# Patient Record
Sex: FEMALE | Race: WHITE | Employment: FULL TIME | URBAN - METROPOLITAN AREA
[De-identification: names, ages, dates, MRNs, and addresses within clinical notes are randomized per-mention and may not be internally consistent; named-entity substitution may affect disease eponyms.]

---

## 2018-03-27 ENCOUNTER — EMPLOYEE WELLNESS (OUTPATIENT)
Dept: OTHER | Age: 28
End: 2018-03-27

## 2018-03-27 LAB
CHOLESTEROL, TOTAL: 193 MG/DL (ref 0–199)
GLUCOSE BLD-MCNC: 93 MG/DL (ref 74–107)
HDLC SERPL-MCNC: 81 MG/DL
LDL CHOLESTEROL CALCULATED: 97 MG/DL (ref 0–99)
TRIGL SERPL-MCNC: 74 MG/DL (ref 0–149)

## 2018-04-02 VITALS — WEIGHT: 136 LBS | BODY MASS INDEX: 21.95 KG/M2

## 2018-05-02 ENCOUNTER — HOSPITAL ENCOUNTER (OUTPATIENT)
Age: 28
Discharge: HOME | End: 2018-05-02
Payer: COMMERCIAL

## 2018-05-02 DIAGNOSIS — F41.9 ANXIETY AND DEPRESSION: ICD-10-CM

## 2018-05-02 DIAGNOSIS — F32.A ANXIETY AND DEPRESSION: ICD-10-CM

## 2018-05-02 DIAGNOSIS — F41.8 DEPRESSION WITH ANXIETY: ICD-10-CM

## 2018-05-02 LAB
ALBUMIN SERPL-MCNC: 4.3 G/DL (ref 3.5–5.2)
ALP BLD-CCNC: 56 U/L (ref 35–104)
ALT SERPL-CCNC: 10 U/L (ref 0–32)
ANION GAP SERPL CALCULATED.3IONS-SCNC: 14 MMOL/L (ref 7–16)
AST SERPL-CCNC: 14 U/L (ref 0–31)
BASOPHILS ABSOLUTE: 0.03 E9/L (ref 0–0.2)
BASOPHILS RELATIVE PERCENT: 0.4 % (ref 0–2)
BILIRUB SERPL-MCNC: 0.5 MG/DL (ref 0–1.2)
BUN BLDV-MCNC: 8 MG/DL (ref 6–20)
CALCIUM SERPL-MCNC: 8.8 MG/DL (ref 8.6–10.2)
CHLORIDE BLD-SCNC: 100 MMOL/L (ref 98–107)
CO2: 27 MMOL/L (ref 22–29)
CREAT SERPL-MCNC: 0.9 MG/DL (ref 0.5–1)
EOSINOPHILS ABSOLUTE: 0.11 E9/L (ref 0.05–0.5)
EOSINOPHILS RELATIVE PERCENT: 1.4 % (ref 0–6)
GFR AFRICAN AMERICAN: >60
GFR NON-AFRICAN AMERICAN: >60 ML/MIN/1.73
GLUCOSE BLD-MCNC: 88 MG/DL (ref 74–109)
HCT VFR BLD CALC: 42.9 % (ref 34–48)
HEMOGLOBIN: 14.5 G/DL (ref 11.5–15.5)
IMMATURE GRANULOCYTES #: 0.02 E9/L
IMMATURE GRANULOCYTES %: 0.2 % (ref 0–5)
LYMPHOCYTES ABSOLUTE: 2.1 E9/L (ref 1.5–4)
LYMPHOCYTES RELATIVE PERCENT: 25.9 % (ref 20–42)
MCH RBC QN AUTO: 31.5 PG (ref 26–35)
MCHC RBC AUTO-ENTMCNC: 33.8 % (ref 32–34.5)
MCV RBC AUTO: 93.3 FL (ref 80–99.9)
MONOCYTES ABSOLUTE: 0.74 E9/L (ref 0.1–0.95)
MONOCYTES RELATIVE PERCENT: 9.1 % (ref 2–12)
NEUTROPHILS ABSOLUTE: 5.12 E9/L (ref 1.8–7.3)
NEUTROPHILS RELATIVE PERCENT: 63 % (ref 43–80)
PDW BLD-RTO: 12.7 FL (ref 11.5–15)
PLATELET # BLD: 244 E9/L (ref 130–450)
PMV BLD AUTO: 9.5 FL (ref 7–12)
POTASSIUM SERPL-SCNC: 3.6 MMOL/L (ref 3.5–5)
RBC # BLD: 4.6 E12/L (ref 3.5–5.5)
SODIUM BLD-SCNC: 141 MMOL/L (ref 132–146)
T4 FREE: 1.45 NG/DL (ref 0.93–1.7)
TOTAL PROTEIN: 6.7 G/DL (ref 6.4–8.3)
TSH SERPL DL<=0.05 MIU/L-ACNC: 2.73 UIU/ML (ref 0.27–4.2)
WBC # BLD: 8.1 E9/L (ref 4.5–11.5)

## 2018-05-02 PROCEDURE — 85025 COMPLETE CBC W/AUTO DIFF WBC: CPT

## 2018-05-02 PROCEDURE — 84439 ASSAY OF FREE THYROXINE: CPT

## 2018-05-02 PROCEDURE — 36415 COLL VENOUS BLD VENIPUNCTURE: CPT

## 2018-05-02 PROCEDURE — 80053 COMPREHEN METABOLIC PANEL: CPT

## 2018-05-02 PROCEDURE — 84443 ASSAY THYROID STIM HORMONE: CPT

## 2018-08-21 ENCOUNTER — HOSPITAL ENCOUNTER (EMERGENCY)
Age: 28
Discharge: HOME | End: 2018-08-21
Payer: COMMERCIAL

## 2018-08-21 ENCOUNTER — NURSE TRIAGE (OUTPATIENT)
Dept: OTHER | Facility: CLINIC | Age: 28
End: 2018-08-21

## 2018-08-21 ENCOUNTER — HOSPITAL ENCOUNTER (EMERGENCY)
Age: 28
Discharge: OTHER INSTITUTION | End: 2018-08-21
Payer: COMMERCIAL

## 2018-08-21 VITALS
RESPIRATION RATE: 18 BRPM | BODY MASS INDEX: 23.45 KG/M2 | SYSTOLIC BLOOD PRESSURE: 139 MMHG | HEIGHT: 67 IN | OXYGEN SATURATION: 99 % | DIASTOLIC BLOOD PRESSURE: 92 MMHG | TEMPERATURE: 99.1 F | HEART RATE: 94 BPM | WEIGHT: 149.44 LBS

## 2018-08-21 VITALS
SYSTOLIC BLOOD PRESSURE: 130 MMHG | RESPIRATION RATE: 18 BRPM | WEIGHT: 135 LBS | BODY MASS INDEX: 21.19 KG/M2 | OXYGEN SATURATION: 100 % | TEMPERATURE: 98.4 F | DIASTOLIC BLOOD PRESSURE: 87 MMHG | HEART RATE: 86 BPM | HEIGHT: 67 IN

## 2018-08-21 DIAGNOSIS — R22.0 FACIAL SWELLING: Primary | ICD-10-CM

## 2018-08-21 DIAGNOSIS — L02.01 ABSCESS OF FACE: Primary | ICD-10-CM

## 2018-08-21 PROCEDURE — 99212 OFFICE O/P EST SF 10 MIN: CPT

## 2018-08-21 PROCEDURE — 10060 I&D ABSCESS SIMPLE/SINGLE: CPT

## 2018-08-21 PROCEDURE — 96372 THER/PROPH/DIAG INJ SC/IM: CPT

## 2018-08-21 PROCEDURE — 6360000002 HC RX W HCPCS: Performed by: PHYSICIAN ASSISTANT

## 2018-08-21 PROCEDURE — 99283 EMERGENCY DEPT VISIT LOW MDM: CPT

## 2018-08-21 RX ORDER — SULFAMETHOXAZOLE AND TRIMETHOPRIM 800; 160 MG/1; MG/1
2 TABLET ORAL 2 TIMES DAILY
Qty: 40 TABLET | Refills: 0 | Status: SHIPPED | OUTPATIENT
Start: 2018-08-21 | End: 2018-08-31

## 2018-08-21 RX ORDER — NAPROXEN 500 MG/1
500 TABLET ORAL 2 TIMES DAILY
Qty: 30 TABLET | Refills: 0 | Status: SHIPPED | OUTPATIENT
Start: 2018-08-21 | End: 2018-11-13

## 2018-08-21 RX ORDER — IBUPROFEN 200 MG
800 TABLET ORAL EVERY 6 HOURS PRN
COMMUNITY
End: 2018-08-24 | Stop reason: ALTCHOICE

## 2018-08-21 RX ORDER — DIPHENHYDRAMINE HCL 25 MG
50 TABLET ORAL EVERY 6 HOURS PRN
COMMUNITY
End: 2018-11-13

## 2018-08-21 RX ORDER — AMOXICILLIN AND CLAVULANATE POTASSIUM 875; 125 MG/1; MG/1
1 TABLET, FILM COATED ORAL 2 TIMES DAILY
Qty: 20 TABLET | Refills: 0 | Status: SHIPPED | OUTPATIENT
Start: 2018-08-21 | End: 2018-08-31

## 2018-08-21 RX ORDER — NAPROXEN 500 MG/1
500 TABLET ORAL 2 TIMES DAILY
Qty: 30 TABLET | Refills: 0 | Status: SHIPPED | OUTPATIENT
Start: 2018-08-21 | End: 2018-08-24 | Stop reason: SDUPTHER

## 2018-08-21 RX ORDER — KETOROLAC TROMETHAMINE 30 MG/ML
60 INJECTION, SOLUTION INTRAMUSCULAR; INTRAVENOUS ONCE
Status: COMPLETED | OUTPATIENT
Start: 2018-08-21 | End: 2018-08-21

## 2018-08-21 RX ORDER — HYDROCODONE BITARTRATE AND ACETAMINOPHEN 5; 325 MG/1; MG/1
1 TABLET ORAL EVERY 6 HOURS PRN
Qty: 6 TABLET | Refills: 0 | Status: SHIPPED | OUTPATIENT
Start: 2018-08-21 | End: 2018-08-24

## 2018-08-21 RX ADMIN — KETOROLAC TROMETHAMINE 60 MG: 30 INJECTION, SOLUTION INTRAMUSCULAR at 14:23

## 2018-08-21 ASSESSMENT — PAIN DESCRIPTION - LOCATION
LOCATION: FACE;MOUTH
LOCATION: MOUTH;EAR

## 2018-08-21 ASSESSMENT — PAIN DESCRIPTION - ORIENTATION
ORIENTATION: LEFT;LOWER
ORIENTATION: LEFT

## 2018-08-21 ASSESSMENT — PAIN DESCRIPTION - ONSET: ONSET: GRADUAL

## 2018-08-21 ASSESSMENT — PAIN DESCRIPTION - FREQUENCY
FREQUENCY: CONTINUOUS
FREQUENCY: CONTINUOUS

## 2018-08-21 ASSESSMENT — PAIN DESCRIPTION - PAIN TYPE
TYPE: ACUTE PAIN
TYPE: ACUTE PAIN

## 2018-08-21 ASSESSMENT — PAIN DESCRIPTION - PROGRESSION: CLINICAL_PROGRESSION: GRADUALLY WORSENING

## 2018-08-21 ASSESSMENT — PAIN SCALES - GENERAL
PAINLEVEL_OUTOF10: 6
PAINLEVEL_OUTOF10: 8
PAINLEVEL_OUTOF10: 10
PAINLEVEL_OUTOF10: 10

## 2018-08-21 ASSESSMENT — PAIN DESCRIPTION - DESCRIPTORS
DESCRIPTORS: THROBBING;JABBING
DESCRIPTORS: ACHING;DISCOMFORT

## 2018-08-21 NOTE — ED PROVIDER NOTES
Cancer in her father. The patients home medications have been reviewed. Allergies: Patient has no known allergies. -------------------------------------------------- RESULTS -------------------------------------------------  All laboratory and radiology results have been personally reviewed by myself   LABS:  No results found for this visit on 08/21/18. RADIOLOGY:  Interpreted by Radiologist.  No orders to display       ------------------------- NURSING NOTES AND VITALS REVIEWED ---------------------------   The nursing notes within the ED encounter and vital signs as below have been reviewed. BP (!) 140/90   Pulse 86   Temp 98.2 °F (36.8 °C) (Oral)   Resp 14   Ht 5' 7\" (1.702 m)   Wt 149 lb 7 oz (67.8 kg)   LMP 07/31/2018   SpO2 98%   BMI 23.41 kg/m²   Oxygen Saturation Interpretation: Normal      ---------------------------------------------------PHYSICAL EXAM--------------------------------------      Constitutional/General: Alert and oriented x3, well appearing, non toxic in NAD  Head: NC/AT  Eyes: PERRL, EOMI  Mouth: Oropharynx clear, handling secretions, no trismus  Neck: Supple, full ROM, no meningeal signs  Pulmonary: Lungs clear to auscultation bilaterally, no wheezes, rales, or rhonchi. Not in respiratory distress  Cardiovascular:  Regular rate and rhythm, no murmurs, gallops, or rubs. 2+ distal pulses  Abdomen: Soft, + BS. No distension. Nontender. No palpable rigidity, rebound or guarding  Extremities: Moves all extremities x 4. Warm and well perfused  Skin: warm and dry without rash  Neurologic: GCS 15,  Intact. No focal deficits  Psych: Normal Affect      ------------------------------ ED COURSE/MEDICAL DECISION MAKING----------------------  Medications   ketorolac (TORADOL) injection 60 mg (60 mg Intramuscular Given 8/21/18 1423)       Procedure:   Localized abscess in the left lower lip 3 cm with pointing internally.  The area around the pointing was numbed with 2 mL 1%

## 2018-08-21 NOTE — TELEPHONE ENCOUNTER
Reason for Disposition   Caller has already spoken with the PCP and has no further questions. Directed to ED from Urgent Care, [facility]    Protocols used: NO CONTACT OR DUPLICATE CONTACT CALL-ADULT-AH    Pt states this all started from picking a pimple on her chin. Now, her mouth is \"almost swollen shut\" and her left side of her face has \"doubled in size. \"  She states the swelling is from under her chin to up into her ear. She states the area is [place] a rock\" and \"hot. \"  She states her bottom lip is \"huge. \"  Pt directed by urgent care, [facility], to [facility].

## 2018-08-21 NOTE — ED PROVIDER NOTES
(1.702 m)   Wt 135 lb (61.2 kg)   LMP 07/31/2018   SpO2 100%   BMI 21.14 kg/m²   Oxygen Saturation Interpretation: Normal      ------------------------------------------ PROGRESS NOTES ------------------------------------------   I have spoken with the patient and discussed todays results, in addition to providing specific details for the plan of care and counseling regarding the diagnosis and prognosis. Their questions are answered at this time and they are agreeable with the plan.      --------------------------------- ADDITIONAL PROVIDER NOTES ---------------------------------      This patient is stable for discharge. I have shared the specific conditions for return, as well as the importance of follow-up. * NOTE: This report was transcribed using voice recognition software. Every effort was made to ensure accuracy; however, inadvertent computerized transcription errors may be present.    --------------------------------- IMPRESSION AND DISPOSITION ---------------------------------    IMPRESSION  1.  Facial swelling        DISPOSITION  Disposition: Discharge to emergency department  Patient condition is stable       Maya Duque PA-C  08/21/18 6584

## 2018-08-31 ENCOUNTER — HOSPITAL ENCOUNTER (OUTPATIENT)
Age: 28
Discharge: HOME | End: 2018-09-02
Payer: COMMERCIAL

## 2018-08-31 PROCEDURE — 87081 CULTURE SCREEN ONLY: CPT

## 2018-09-02 LAB — MRSA CULTURE ONLY: NORMAL

## 2018-11-14 ENCOUNTER — OFFICE VISIT (OUTPATIENT)
Dept: SURGERY | Age: 28
End: 2018-11-14

## 2018-11-14 DIAGNOSIS — R23.8 FACIAL AGING: Primary | ICD-10-CM

## 2018-11-14 PROCEDURE — DM00320: Performed by: PLASTIC SURGERY

## 2018-11-14 PROCEDURE — DM00180: Performed by: PLASTIC SURGERY

## 2018-11-14 NOTE — PROGRESS NOTES
External ear normal.   Left Ear: External ear normal.   Nose: Nose normal.   Mouth/Throat: Oropharynx is clear and moist.   Eyes: Conjunctivae and extraocular motions are normal. Pupils are equal, round, and reactive to light. Neck: Normal range of motion. Neck supple. Cardiovascular: Normal rate, regular rhythm, normal heart sounds and intact distal pulses. No murmur heard. Pulmonary/Chest: Effort normal and breath sounds normal.   Abdominal: Soft. Bowel sounds are normal. She exhibits no mass. No tenderness. Musculoskeletal: Normal range of motion. She exhibits no edema. Lymphadenopathy: She has no cervical adenopathy. Neurological: She is alert and oriented to person, place, and time. She has normal and symmetric cranial nerves II - XII  Skin: Warm and dry. FACE: Examination reveals mild actinic damage, transverse forehead rhytids, glabellar frown lines, crows feet, and deepened NLF. There is midface soft tissue decent as well as volume loss to the malar area. There is no jowling. NECK: There is excess skin present. There is minimal fat in both superficial and deep to the platysma. DATA:    Radiology Review:  none    IMPRESSION/RECOMMENDATIONS:      The patient will benefit from    1) Botox to the forehead and glabella  2) Belotero to vermilion border and upper and lower red lip as well as acne scars. The risks, benefits and options were discussed with the pt. The risks included but not limited to pain, bleeding, infection, heavy scarring, damage to surrounding structures, blindness, fluid collections, asymmetry, and need for further procedures. All of Her questions were answered to her satisfaction and She agrees to proceed with the operation. Botulinum Toxin Injection Procedure Note:      The risks, benefits and options were discussed with the pt. The risks included but not limited to pain, bleeding, infection, asymmetry,  and need for further procedures.  The patient minutes off as desired to counteract swelling. The patient was educated that bruising could last from 10 days to 2 weeks. Makeup can be applied beginning the following day. Follow up in 2 weeks or sooner with any concerns. I attest that the patient was seen and examined by me, and concur with the documentation above. I agree with the assessment and the plan outlined. This document is generated, in part, by voice recognition software and thus  syntax and grammatical errors are possible.     [place]  2:22 PM  11/21/2018

## 2019-01-14 ENCOUNTER — HOSPITAL ENCOUNTER (OUTPATIENT)
Age: 29
Discharge: HOME | End: 2019-01-14
Payer: COMMERCIAL

## 2019-01-14 DIAGNOSIS — R53.83 FATIGUE, UNSPECIFIED TYPE: ICD-10-CM

## 2019-01-14 DIAGNOSIS — K58.2 IRRITABLE BOWEL SYNDROME WITH BOTH CONSTIPATION AND DIARRHEA: ICD-10-CM

## 2019-01-14 DIAGNOSIS — F41.9 ANXIETY: ICD-10-CM

## 2019-01-14 LAB
ALBUMIN SERPL-MCNC: 4.2 G/DL (ref 3.5–5.2)
ALP BLD-CCNC: 58 U/L (ref 35–104)
ALT SERPL-CCNC: 12 U/L (ref 0–32)
ANION GAP SERPL CALCULATED.3IONS-SCNC: 15 MMOL/L (ref 7–16)
AST SERPL-CCNC: 13 U/L (ref 0–31)
BASOPHILS ABSOLUTE: 0.03 E9/L (ref 0–0.2)
BASOPHILS RELATIVE PERCENT: 0.6 % (ref 0–2)
BILIRUB SERPL-MCNC: 0.4 MG/DL (ref 0–1.2)
BUN BLDV-MCNC: 9 MG/DL (ref 6–20)
CALCIUM SERPL-MCNC: 8.6 MG/DL (ref 8.6–10.2)
CHLORIDE BLD-SCNC: 104 MMOL/L (ref 98–107)
CO2: 24 MMOL/L (ref 22–29)
CREAT SERPL-MCNC: 0.8 MG/DL (ref 0.5–1)
EOSINOPHILS ABSOLUTE: 0.21 E9/L (ref 0.05–0.5)
EOSINOPHILS RELATIVE PERCENT: 4.1 % (ref 0–6)
GFR AFRICAN AMERICAN: >60
GFR NON-AFRICAN AMERICAN: >60 ML/MIN/1.73
GLUCOSE BLD-MCNC: 111 MG/DL (ref 74–99)
HCT VFR BLD CALC: 43.5 % (ref 34–48)
HEMOGLOBIN: 14.2 G/DL (ref 11.5–15.5)
IMMATURE GRANULOCYTES #: 0.01 E9/L
IMMATURE GRANULOCYTES %: 0.2 % (ref 0–5)
LYMPHOCYTES ABSOLUTE: 2.1 E9/L (ref 1.5–4)
LYMPHOCYTES RELATIVE PERCENT: 41.2 % (ref 20–42)
MCH RBC QN AUTO: 31.8 PG (ref 26–35)
MCHC RBC AUTO-ENTMCNC: 32.6 % (ref 32–34.5)
MCV RBC AUTO: 97.3 FL (ref 80–99.9)
MONOCYTES ABSOLUTE: 0.41 E9/L (ref 0.1–0.95)
MONOCYTES RELATIVE PERCENT: 8 % (ref 2–12)
NEUTROPHILS ABSOLUTE: 2.34 E9/L (ref 1.8–7.3)
NEUTROPHILS RELATIVE PERCENT: 45.9 % (ref 43–80)
PDW BLD-RTO: 12.9 FL (ref 11.5–15)
PLATELET # BLD: 227 E9/L (ref 130–450)
PMV BLD AUTO: 10.2 FL (ref 7–12)
POTASSIUM SERPL-SCNC: 4.2 MMOL/L (ref 3.5–5)
RBC # BLD: 4.47 E12/L (ref 3.5–5.5)
SODIUM BLD-SCNC: 143 MMOL/L (ref 132–146)
TOTAL PROTEIN: 6.5 G/DL (ref 6.4–8.3)
VITAMIN B-12: >2000 PG/ML (ref 211–946)
WBC # BLD: 5.1 E9/L (ref 4.5–11.5)

## 2019-01-14 PROCEDURE — 80053 COMPREHEN METABOLIC PANEL: CPT

## 2019-01-14 PROCEDURE — 85025 COMPLETE CBC W/AUTO DIFF WBC: CPT

## 2019-01-14 PROCEDURE — 36415 COLL VENOUS BLD VENIPUNCTURE: CPT

## 2019-01-14 PROCEDURE — 82607 VITAMIN B-12: CPT

## 2019-12-04 ENCOUNTER — OFFICE VISIT (OUTPATIENT)
Dept: SURGERY | Age: 29
End: 2019-12-04

## 2019-12-04 DIAGNOSIS — R23.8 FACIAL AGING: Primary | ICD-10-CM

## 2019-12-04 PROCEDURE — MISCOB PR OFFICE/OUTPT VISIT,PROCEDURE ONLY: Performed by: PLASTIC SURGERY

## 2019-12-04 PROCEDURE — MISCOTC5 OBAGI TRETINOIN CREAM 0.05 20GM: Performed by: PLASTIC SURGERY

## 2020-03-24 ENCOUNTER — NURSE TRIAGE (OUTPATIENT)
Dept: OTHER | Facility: CLINIC | Age: 30
End: 2020-03-24

## 2020-03-24 ENCOUNTER — TELEPHONE (OUTPATIENT)
Dept: ADMINISTRATIVE | Age: 30
End: 2020-03-24

## 2020-03-24 ENCOUNTER — OFFICE VISIT (OUTPATIENT)
Dept: PRIMARY CARE CLINIC | Age: 30
End: 2020-03-24
Payer: COMMERCIAL

## 2020-03-24 VITALS
HEART RATE: 123 BPM | OXYGEN SATURATION: 98 % | WEIGHT: 145 LBS | BODY MASS INDEX: 23.3 KG/M2 | DIASTOLIC BLOOD PRESSURE: 90 MMHG | HEIGHT: 66 IN | SYSTOLIC BLOOD PRESSURE: 120 MMHG | TEMPERATURE: 98 F

## 2020-03-24 LAB
INFLUENZA A ANTIGEN, POC: NORMAL
INFLUENZA B ANTIGEN, POC: NORMAL
S PYO AG THROAT QL: NORMAL

## 2020-03-24 PROCEDURE — 87880 STREP A ASSAY W/OPTIC: CPT | Performed by: NURSE PRACTITIONER

## 2020-03-24 PROCEDURE — 99213 OFFICE O/P EST LOW 20 MIN: CPT | Performed by: NURSE PRACTITIONER

## 2020-03-24 PROCEDURE — 87804 INFLUENZA ASSAY W/OPTIC: CPT | Performed by: NURSE PRACTITIONER

## 2020-03-24 RX ORDER — ALBUTEROL SULFATE 90 UG/1
2 AEROSOL, METERED RESPIRATORY (INHALATION) EVERY 6 HOURS PRN
Qty: 1 INHALER | Refills: 3 | Status: SHIPPED
Start: 2020-03-24 | End: 2021-01-27

## 2020-03-24 NOTE — TELEPHONE ENCOUNTER
Sob, cough, runny nose, no thermometer at this time. Patient wants to go to flu clinic since its almost 3 pm.  Left message for triage nurse to call patient.

## 2020-03-24 NOTE — PATIENT INSTRUCTIONS
Patient Education        Coronavirus Disease (COVID-19): Care Instructions  Overview  The coronavirus disease 2019 (COVID-19) is caused by a virus. It was first found in [place], [place], in December 2019. It has since spread to other countries. It can cause fever, cough, and trouble breathing. In severe cases, it can cause pneumonia and make it hard to breathe without help. It can cause death. This virus spreads person-to-person through droplets from coughing and sneezing. It may also spread by touching something that has the virus on it, such as a doorknob or a tabletop. Symptoms include a cough, a fever, and shortness of breath. Most people only have mild symptoms or even none at all. People who are very sick are treated in a hospital.  The virus is diagnosed with a test that uses a swab of fluid from the nose or throat or sometimes uses sputum from the lungs. Other tests may be done, such as blood tests and CT scans of the lungs. But even if you don't have a test, a doctor may tell you, after doing an exam and asking you questions, that you probably have been exposed to the virus. There is no medicine to fight the virus. If you have mild symptoms, you can care for yourself at home. Your doctor may have you take acetaminophen (Tylenol) or ibuprofen (Advil) for a fever. Treatment in the hospital for more serious cases includes support, such as help with breathing. If you have been diagnosed with COVID-19 or are at high risk of having been exposed to the virus, you must take steps to keep from spreading it to others. You will be told to isolate yourself for about 14 days, probably at home. Your doctor or another health professional will tell you when it's okay to come out of isolation. You can find the latest information from these sources:  · U.S. Centers for Disease Control and Prevention (CDC).  Go to the [facility] website at [facility] for updates about the disease and for ways to prevent the spread of infection. · World Health Organization [facility]). Go to the [facility] website at www.who.int for information about the virus, including updates on the outbreak and for travel advice. Follow-up care is a key part of your treatment and safety. Be sure to make and go to all appointments, and call your doctor if you are having problems. It's also a good idea to know your test results and keep a list of the medicines you take. How can you care for yourself at home? · Be safe with medicines. Take your medicines exactly as prescribed. Call your doctor if you think you are having a problem with your medicine. · Stay home. Don't go to school, work, or public areas. And don't use public transportation. Leave your home only if you need to get medical care. But call the doctor's office first so they know you're coming, and wear a face mask when you go. · Wear a face mask if you are sick and are around other people. It can help stop the spread of the virus when you cough or sneeze. · Limit contact with people in your home. If possible, stay in a separate bedroom and use a separate bathroom. · Avoid contact with pets and other animals. · Cover your mouth and nose with a tissue when you cough or sneeze. Then throw it in the trash right away. · Wash your hands often, especially after you cough or sneeze. Use soap and water, and scrub for at least 20 seconds. If soap and water aren't available, use an alcohol-based hand . · Don't share personal household items. These include bedding, towels, cups and glasses, and eating utensils. · Clean and disinfect your home every day. Use household  and disinfectant wipes or sprays. Take special care to clean things that you grab with your hands. These include doorknobs, remote controls, phones, and handles on your refrigerator and microwave. And don't forget countertops, tabletops, bathrooms, and computer keyboards.   · Wait for your doctor or other health professional to tell you it's okay to leave isolation. Call them when the time is up and let them know how you're feeling. When should you call for help? Call 911 anytime you think you may need emergency care. For example, call if:    · You have severe trouble breathing.     · You have severe dehydration. Symptoms of dehydration include:  ? Dry eyes and a dry mouth. ? Passing only a little urine. ? Feeling thirstier than usual.     · You are extremely confused or not thinking clearly.     · You pass out (lose consciousness).   If you have a high risk of having been exposed to this virus, or you have tested positive but don't have symptoms, call your doctor now if you develop symptoms such as:    · Shortness of breath.     · Fever.     · Cough.     If you have been diagnosed with coronavirus disease and already have a cough, fever, or shortness of breath, call your doctor now if your symptoms get worse or you don't get better as expected.   Whether you have symptoms or not, call ahead to the doctor's office before you go. Make sure you wear a face mask when you go to the doctor, to prevent exposing other people to the virus. Current as of: March 12, 2020               Content Version: 12.4  © 1907-2944 [employer]. Care instructions adapted under license by your healthcare professional. If you have questions about a medical condition or this instruction, always ask your healthcare professional. [address] any warranty or liability for your use of this information.

## 2020-03-25 ENCOUNTER — OFFICE VISIT (OUTPATIENT)
Dept: PRIMARY CARE CLINIC | Age: 30
End: 2020-03-25

## 2020-03-25 ENCOUNTER — HOSPITAL ENCOUNTER (OUTPATIENT)
Age: 30
Discharge: HOME | End: 2020-03-27
Payer: COMMERCIAL

## 2020-03-25 ENCOUNTER — TELEPHONE (OUTPATIENT)
Dept: PRIMARY CARE CLINIC | Age: 30
End: 2020-03-25

## 2020-03-25 PROCEDURE — U0002 COVID-19 LAB TEST NON-CDC: HCPCS

## 2020-03-30 ENCOUNTER — TELEPHONE (OUTPATIENT)
Dept: PRIMARY CARE CLINIC | Age: 30
End: 2020-03-30

## 2020-03-30 LAB
REPORT: NORMAL
SARS-COV-2: NOT DETECTED
THIS TEST SENT TO: NORMAL

## 2020-08-14 ENCOUNTER — APPOINTMENT (OUTPATIENT)
Dept: ULTRASOUND IMAGING | Age: 30
End: 2020-08-14
Payer: COMMERCIAL

## 2020-08-14 ENCOUNTER — HOSPITAL ENCOUNTER (EMERGENCY)
Age: 30
Discharge: HOME | End: 2020-08-14
Attending: EMERGENCY MEDICINE
Payer: COMMERCIAL

## 2020-08-14 ENCOUNTER — NURSE TRIAGE (OUTPATIENT)
Dept: OTHER | Facility: CLINIC | Age: 30
End: 2020-08-14

## 2020-08-14 VITALS
HEART RATE: 82 BPM | DIASTOLIC BLOOD PRESSURE: 72 MMHG | HEIGHT: 66 IN | RESPIRATION RATE: 16 BRPM | SYSTOLIC BLOOD PRESSURE: 113 MMHG | OXYGEN SATURATION: 96 % | WEIGHT: 180 LBS | TEMPERATURE: 98.8 F | BODY MASS INDEX: 28.93 KG/M2

## 2020-08-14 LAB
ABO/RH: NORMAL
ANION GAP SERPL CALCULATED.3IONS-SCNC: 13 MMOL/L (ref 7–16)
BACTERIA: ABNORMAL /HPF
BASOPHILS ABSOLUTE: 0.03 E9/L (ref 0–0.2)
BASOPHILS RELATIVE PERCENT: 0.3 % (ref 0–2)
BILIRUBIN URINE: NEGATIVE
BLOOD, URINE: ABNORMAL
BUN BLDV-MCNC: 8 MG/DL (ref 6–20)
CALCIUM SERPL-MCNC: 8.9 MG/DL (ref 8.6–10.2)
CHLORIDE BLD-SCNC: 102 MMOL/L (ref 98–107)
CLARITY: CLEAR
CO2: 20 MMOL/L (ref 22–29)
COLOR: YELLOW
CREAT SERPL-MCNC: 0.7 MG/DL (ref 0.5–1)
EOSINOPHILS ABSOLUTE: 0.12 E9/L (ref 0.05–0.5)
EOSINOPHILS RELATIVE PERCENT: 1.3 % (ref 0–6)
EPITHELIAL CELLS, UA: ABNORMAL /HPF
GFR AFRICAN AMERICAN: >60
GFR NON-AFRICAN AMERICAN: >60 ML/MIN/1.73
GLUCOSE BLD-MCNC: 79 MG/DL (ref 74–99)
GLUCOSE URINE: NEGATIVE MG/DL
GONADOTROPIN, CHORIONIC (HCG) QUANT: ABNORMAL MIU/ML
HCT VFR BLD CALC: 40.2 % (ref 34–48)
HEMOGLOBIN: 13.8 G/DL (ref 11.5–15.5)
IMMATURE GRANULOCYTES #: 0.04 E9/L
IMMATURE GRANULOCYTES %: 0.4 % (ref 0–5)
KETONES, URINE: NEGATIVE MG/DL
LEUKOCYTE ESTERASE, URINE: NEGATIVE
LYMPHOCYTES ABSOLUTE: 1.61 E9/L (ref 1.5–4)
LYMPHOCYTES RELATIVE PERCENT: 17.7 % (ref 20–42)
MCH RBC QN AUTO: 33 PG (ref 26–35)
MCHC RBC AUTO-ENTMCNC: 34.3 % (ref 32–34.5)
MCV RBC AUTO: 96.2 FL (ref 80–99.9)
MONOCYTES ABSOLUTE: 0.75 E9/L (ref 0.1–0.95)
MONOCYTES RELATIVE PERCENT: 8.2 % (ref 2–12)
NEUTROPHILS ABSOLUTE: 6.57 E9/L (ref 1.8–7.3)
NEUTROPHILS RELATIVE PERCENT: 72.1 % (ref 43–80)
NITRITE, URINE: NEGATIVE
PDW BLD-RTO: 12.4 FL (ref 11.5–15)
PH UA: 6.5 (ref 5–9)
PLATELET # BLD: 276 E9/L (ref 130–450)
PMV BLD AUTO: 10 FL (ref 7–12)
POTASSIUM SERPL-SCNC: 3.9 MMOL/L (ref 3.5–5)
PROTEIN UA: NEGATIVE MG/DL
RBC # BLD: 4.18 E12/L (ref 3.5–5.5)
RBC UA: ABNORMAL /HPF (ref 0–2)
SODIUM BLD-SCNC: 135 MMOL/L (ref 132–146)
SPECIFIC GRAVITY UA: 1.02 (ref 1–1.03)
UROBILINOGEN, URINE: 0.2 E.U./DL
WBC # BLD: 9.1 E9/L (ref 4.5–11.5)
WBC UA: ABNORMAL /HPF (ref 0–5)

## 2020-08-14 PROCEDURE — 80048 BASIC METABOLIC PNL TOTAL CA: CPT

## 2020-08-14 PROCEDURE — 84702 CHORIONIC GONADOTROPIN TEST: CPT

## 2020-08-14 PROCEDURE — 36415 COLL VENOUS BLD VENIPUNCTURE: CPT

## 2020-08-14 PROCEDURE — 99283 EMERGENCY DEPT VISIT LOW MDM: CPT

## 2020-08-14 PROCEDURE — 86901 BLOOD TYPING SEROLOGIC RH(D): CPT

## 2020-08-14 PROCEDURE — 99282 EMERGENCY DEPT VISIT SF MDM: CPT

## 2020-08-14 PROCEDURE — 76817 TRANSVAGINAL US OBSTETRIC: CPT

## 2020-08-14 PROCEDURE — 85025 COMPLETE CBC W/AUTO DIFF WBC: CPT

## 2020-08-14 PROCEDURE — 86900 BLOOD TYPING SEROLOGIC ABO: CPT

## 2020-08-14 PROCEDURE — 81001 URINALYSIS AUTO W/SCOPE: CPT

## 2020-08-14 ASSESSMENT — ENCOUNTER SYMPTOMS
SORE THROAT: 0
PHOTOPHOBIA: 0
ABDOMINAL PAIN: 0
COUGH: 0
WHEEZING: 0
NAUSEA: 0
SINUS PRESSURE: 0
SINUS PAIN: 0
BACK PAIN: 0
DIARRHEA: 0
SHORTNESS OF BREATH: 0
RHINORRHEA: 0
CONSTIPATION: 0
VOMITING: 0

## 2020-08-14 ASSESSMENT — PAIN DESCRIPTION - DESCRIPTORS: DESCRIPTORS: CRAMPING

## 2020-08-14 ASSESSMENT — PAIN SCALES - GENERAL: PAINLEVEL_OUTOF10: 3

## 2020-08-14 ASSESSMENT — PAIN DESCRIPTION - LOCATION: LOCATION: ABDOMEN

## 2020-08-14 ASSESSMENT — PAIN DESCRIPTION - PAIN TYPE: TYPE: ACUTE PAIN

## 2020-08-14 NOTE — TELEPHONE ENCOUNTER
Reason for Disposition   Intermittent lower abdominal pain (e.g., cramping) lasting > 24 hours    Answer Assessment - Initial Assessment Questions  1. ONSET: \"When did this bleeding start? \"        8/13/2020  2. DESCRIPTION: \"Describe the bleeding that you are having. \" \"How much bleeding is there? \"     - SPOTTING: spotting, or pinkish / brownish mucous discharge; does not fill panti-liner or pad     - MILD:  less than 1 pad / hour; less than patient's usual menstrual bleeding    - MODERATE: 1-2 pads / hour; small-medium blood clots (e.g., pea, grape, small coin)     - SEVERE: soaking 2 or more pads/hour for 2 or more hours; bleeding not contained by pads or continuous red blood from vagina; large blood clots (e.g., golf ball, large coin)       mild  3. ABDOMINAL PAIN SEVERITY: If present, ask: \"How bad is it? \"  (e.g., Scale 1-10; mild, moderate, or severe)    - MILD (1-3): doesn't interfere with normal activities, abdomen soft and not tender to touch     - MODERATE (4-7): interferes with normal activities or awakens from sleep, tender to touch     - SEVERE (8-10): excruciating pain, doubled over, unable to do any normal activities      mild  4. PREGNANCY: \"Do you know how many weeks or months pregnant you are?\" \"When was the first day of your last normal menstrual period? \"      6 weeks and 5 days  5. HEMODYNAMIC STATUS: \"Are you weak or feeling lightheaded? \" If so, ask: \"Can you stand and walk normally? \"       no  6. OTHER SYMPTOMS: \"What other symptoms are you having with the bleeding? \" (e.g., passed tissue, vaginal discharge, fever, menstrual-type cramps)      Passed clots    Protocols used: PREGNANCY - VAGINAL BLEEDING LESS THAN 20 WEEKS EGA-ADULT-OH    Caller states she is 6.5 weeks pregnant and having mild vaginal bleeding with mild cramping that started 8/13/2020. OB is gone for the day and referred her to us. Caller states passed a few clots as well. Not lightheaded, no fever.     Recommendation See today in office. (OB is closed now) referred caller to go to [facility].

## 2020-08-14 NOTE — ED NOTES
Mother in law Olga Botello [phone] is waiting in the car     [address]  08/14/20 1310

## 2020-08-14 NOTE — ED PROVIDER NOTES
Patient is a 48-year-old female that is  who presents the chief complaint of vaginal bleeding. Patient states that she is currently 6 weeks 5 days pregnant with her first pregnancy, no previous miscarriages or abortions in the past.  She states that yesterday she had some small amount of dark red blood and then resolved. States that today she had heavier bleeding and had a couple of blood clots that she became concerned and called her OB/GYN. They state that they cannot get her in until next week so she decided come in for evaluation. Patient denies any associated lightheadedness, dizziness, chest pain, shortness breath, abdominal pain, nausea, vomiting, dysuria, hematuria, diarrhea, constipation. No known blood clotting disorders. The history is provided by the patient. No  was used. Review of Systems   Constitutional: Negative for chills, fatigue and fever. HENT: Negative for congestion, rhinorrhea, sinus pressure, sinus pain, sneezing and sore throat. Eyes: Negative for photophobia and visual disturbance. Respiratory: Negative for cough, shortness of breath and wheezing. Cardiovascular: Negative for chest pain and palpitations. Gastrointestinal: Negative for abdominal pain, constipation, diarrhea, nausea and vomiting. Genitourinary: Positive for vaginal bleeding. Negative for dysuria, frequency and hematuria. Musculoskeletal: Negative for back pain, neck pain and neck stiffness. Skin: Negative for rash and wound. Neurological: Negative for dizziness, light-headedness and headaches. Psychiatric/Behavioral: Negative for agitation, behavioral problems and confusion. Physical Exam  Constitutional:       General: She is not in acute distress. Appearance: She is not toxic-appearing. HENT:      Head: Normocephalic and atraumatic. Mouth/Throat:      Mouth: Mucous membranes are moist.   Eyes:      General: No scleral icterus.      Pupils: 14, 2020   1637 Patient initially had a hemolyzed beta-hCG, and it was redrawn. Is still pending at this time. Patient does have a live intrauterine pregnancy estimated at 6 weeks 5 days, . I did discuss the patient she has no evidence of a urinary tract infection. She will follow-up with OB/GYN, I would not make her wait for the repeat quant to result at this time. She like to go home. It will not change my disposition of the patient at this time. She is refusing a pelvic examination at this time. 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 No further questions. [MS]      ED Course User Index  [MS] Sobeida Lopez DO       --------------------------------------------- PAST HISTORY ---------------------------------------------  Past Medical History:  has a past medical history of Anxiety. Past Surgical History:  has a past surgical history that includes Tonsillectomy and Pace tooth extraction. Social History:  reports that she has never smoked. She has never used smokeless tobacco. She reports current alcohol use. She reports that she does not use drugs. Family History: family history includes Breast Cancer in her maternal grandmother; Colon Cancer in her father; Depression in her mother; Diabetes in her maternal aunt and paternal aunt; Lung Cancer in her father. The patients home medications have been reviewed. Allergies: Patient has no known allergies.     -------------------------------------------------- RESULTS -------------------------------------------------  Labs:  Results for orders placed or performed during the hospital encounter of 08/14/20   CBC Auto Differential   Result Value Ref Range    WBC 9.1 4.5 - 11.5 E9/L    RBC 4.18 3.50 - 5.50 E12/L    Hemoglobin 13.8 11.5 - 15.5 g/dL    Hematocrit 40.2 34.0 - 48.0 %    MCV 96.2 80.0 - 99.9 fL    MCH 33.0 26.0 - 35.0 pg    MCHC 34.3 32.0 - 34.5 %    RDW 12.4 11.5 - 15.0 fL    Platelets 024 945 - 203 E9/L    MPV 10.0 7.0 - 12.0 fL    Neutrophils % 72.1 43.0 - 80.0 %    Immature Granulocytes % 0.4 0.0 - 5.0 %    Lymphocytes % 17.7 (L) 20.0 - 42.0 %    Monocytes % 8.2 2.0 - 12.0 %    Eosinophils % 1.3 0.0 - 6.0 %    Basophils % 0.3 0.0 - 2.0 %    Neutrophils Absolute 6.57 1.80 - 7.30 E9/L    Immature Granulocytes # 0.04 E9/L    Lymphocytes Absolute 1.61 1.50 - 4.00 E9/L    Monocytes Absolute 0.75 0.10 - 0.95 E9/L    Eosinophils Absolute 0.12 0.05 - 0.50 E9/L    Basophils Absolute 0.03 0.00 - 0.20 E9/L   Urinalysis   Result Value Ref Range    Color, UA Yellow Straw/Yellow    Clarity, UA Clear Clear    Glucose, Ur Negative Negative mg/dL    Bilirubin Urine Negative Negative    Ketones, Urine Negative Negative mg/dL    Specific Gravity, UA 1.025 1.005 - 1.030    Blood, Urine LARGE (A) Negative    pH, UA 6.5 5.0 - 9.0    Protein, UA Negative Negative mg/dL    Urobilinogen, Urine 0.2 <2.0 E.U./dL    Nitrite, Urine Negative Negative    Leukocyte Esterase, Urine Negative Negative   Microscopic Urinalysis   Result Value Ref Range    WBC, UA NONE 0 - 5 /HPF    RBC, UA 0-1 0 - 2 /HPF    Epithelial Cells, UA RARE /HPF    Bacteria, UA RARE (A) None Seen /HPF   Basic metabolic panel   Result Value Ref Range    Sodium 135 132 - 146 mmol/L    Potassium 3.9 3.5 - 5.0 mmol/L    Chloride 102 98 - 107 mmol/L    CO2 20 (L) 22 - 29 mmol/L    Anion Gap 13 7 - 16 mmol/L    Glucose 79 74 - 99 mg/dL    BUN 8 6 - 20 mg/dL    CREATININE 0.7 0.5 - 1.0 mg/dL    GFR Non-African American >60 >=60 mL/min/1.73    GFR African American >60     Calcium 8.9 8.6 - 10.2 mg/dL   ABO/RH   Result Value Ref Range    ABO/Rh A POS        Radiology:  US OB TRANSVAGINAL   Final Result   1. Single, live, 6 week and 5 day intrauterine pregnancy with EDC of   04/03/2021   2.  Mildly complex left ovarian corpus luteum cyst measuring 1.6 x 1.3 x 1 cm.             ------------------------- NURSING NOTES AND VITALS REVIEWED

## 2020-08-24 ENCOUNTER — HOSPITAL ENCOUNTER (OUTPATIENT)
Age: 30
Discharge: HOME | End: 2020-08-26
Payer: COMMERCIAL

## 2020-08-24 PROCEDURE — 87591 N.GONORRHOEAE DNA AMP PROB: CPT

## 2020-08-24 PROCEDURE — 87491 CHLMYD TRACH DNA AMP PROBE: CPT

## 2020-08-24 PROCEDURE — 88175 CYTOPATH C/V AUTO FLUID REDO: CPT

## 2020-08-24 PROCEDURE — 87088 URINE BACTERIA CULTURE: CPT

## 2020-08-24 PROCEDURE — 87624 HPV HI-RISK TYP POOLED RSLT: CPT

## 2020-08-26 LAB — URINE CULTURE, ROUTINE: NORMAL

## 2020-08-27 LAB
CHLAMYDIA BY THIN PREP: NEGATIVE
HPV SAMPLE: NORMAL
HPV TYPE 16: NOT DETECTED
HPV TYPE 18: NOT DETECTED
HPV, HIGH RISK OTHER: NOT DETECTED
INTERPRETATION: NORMAL
N. GONORRHOEAE DNA, THIN PREP: NEGATIVE
SOURCE: NORMAL
SOURCE: NORMAL

## 2020-09-21 ENCOUNTER — HOSPITAL ENCOUNTER (OUTPATIENT)
Age: 30
Discharge: HOME | End: 2020-09-23
Payer: COMMERCIAL

## 2020-09-21 LAB
HCT VFR BLD CALC: 41.3 % (ref 34–48)
HEMOGLOBIN: 13.9 G/DL (ref 11.5–15.5)
MCH RBC QN AUTO: 32.6 PG (ref 26–35)
MCHC RBC AUTO-ENTMCNC: 33.7 % (ref 32–34.5)
MCV RBC AUTO: 96.7 FL (ref 80–99.9)
PDW BLD-RTO: 12.9 FL (ref 11.5–15)
PLATELET # BLD: 285 E9/L (ref 130–450)
PMV BLD AUTO: 10.4 FL (ref 7–12)
RBC # BLD: 4.27 E12/L (ref 3.5–5.5)
WBC # BLD: 8.8 E9/L (ref 4.5–11.5)

## 2020-09-21 PROCEDURE — 84443 ASSAY THYROID STIM HORMONE: CPT

## 2020-09-21 PROCEDURE — 86900 BLOOD TYPING SEROLOGIC ABO: CPT

## 2020-09-21 PROCEDURE — 86850 RBC ANTIBODY SCREEN: CPT

## 2020-09-21 PROCEDURE — 86703 HIV-1/HIV-2 1 RESULT ANTBDY: CPT

## 2020-09-21 PROCEDURE — 87340 HEPATITIS B SURFACE AG IA: CPT

## 2020-09-21 PROCEDURE — 86592 SYPHILIS TEST NON-TREP QUAL: CPT

## 2020-09-21 PROCEDURE — 85027 COMPLETE CBC AUTOMATED: CPT

## 2020-09-21 PROCEDURE — 86762 RUBELLA ANTIBODY: CPT

## 2020-09-21 PROCEDURE — 86787 VARICELLA-ZOSTER ANTIBODY: CPT

## 2020-09-21 PROCEDURE — 86901 BLOOD TYPING SEROLOGIC RH(D): CPT

## 2020-09-22 LAB
ABO/RH: NORMAL
ANTIBODY SCREEN: NORMAL
HEPATITIS B SURFACE ANTIGEN INTERPRETATION: NORMAL
HIV-1 AND HIV-2 ANTIBODIES: NORMAL
RPR: NORMAL
TSH SERPL DL<=0.05 MIU/L-ACNC: 2.75 UIU/ML (ref 0.27–4.2)
VARICELLA-ZOSTER VIRUS AB, IGG: NORMAL

## 2020-09-24 LAB — RUBELLA ANTIBODY IGG: NORMAL

## 2020-10-19 ENCOUNTER — HOSPITAL ENCOUNTER (OUTPATIENT)
Age: 30
Discharge: HOME | End: 2020-10-21
Payer: COMMERCIAL

## 2020-10-19 PROCEDURE — 82105 ALPHA-FETOPROTEIN SERUM: CPT

## 2020-10-22 LAB
AFP INTERPRETATION: NORMAL
AFP MOM: 0.98
AFP SPECIMEN: NORMAL
DATING: NORMAL
ESTIMATED DUE DATE: NORMAL
FETUS COUNT: NORMAL
GESTATIONAL AGE CALC AT COLLECT: NORMAL
HISTORY/NEURAL TUBE DEFECTS: NO
INSULIN DEP. DIABETIC: NO
MATERNAL AGE AT EDD: 31.3 YR
MATERNAL WEIGHT: NORMAL
PT AFP: 26 NG/ML
RACE: NORMAL
SMOKING: NO

## 2021-01-13 DIAGNOSIS — Z34.93 PRENATAL CARE IN THIRD TRIMESTER: ICD-10-CM

## 2021-01-13 LAB
GLUCOSE TOLERANCE SCREEN 50G: 126 MG/DL (ref 70–140)
HCT VFR BLD CALC: 36.5 % (ref 34–48)
HEMOGLOBIN: 11.8 G/DL (ref 11.5–15.5)
MCH RBC QN AUTO: 31.6 PG (ref 26–35)
MCHC RBC AUTO-ENTMCNC: 32.3 % (ref 32–34.5)
MCV RBC AUTO: 97.9 FL (ref 80–99.9)
PDW BLD-RTO: 14.6 FL (ref 11.5–15)
PLATELET # BLD: 208 E9/L (ref 130–450)
PMV BLD AUTO: 11.2 FL (ref 7–12)
RBC # BLD: 3.73 E12/L (ref 3.5–5.5)
WBC # BLD: 9.6 E9/L (ref 4.5–11.5)

## 2021-01-14 LAB — ANTIBODY SCREEN: NORMAL

## 2021-01-27 PROBLEM — Z34.93 PRENATAL CARE IN THIRD TRIMESTER: Status: ACTIVE | Noted: 2021-01-27

## 2021-03-03 DIAGNOSIS — Z34.93 PRENATAL CARE IN THIRD TRIMESTER: ICD-10-CM

## 2021-03-03 DIAGNOSIS — O99.213 OBESITY AFFECTING PREGNANCY IN THIRD TRIMESTER: ICD-10-CM

## 2021-03-06 LAB — GROUP B STREP CULTURE: NORMAL

## 2021-03-08 LAB
Lab: NORMAL
REPORT: NORMAL
THIS TEST SENT TO: NORMAL

## 2021-03-17 PROBLEM — O99.213 OBESITY AFFECTING PREGNANCY IN THIRD TRIMESTER: Status: ACTIVE | Noted: 2021-03-17

## 2021-03-18 DIAGNOSIS — Z34.93 PRENATAL CARE IN THIRD TRIMESTER: ICD-10-CM

## 2021-03-18 DIAGNOSIS — Z11.52 ENCOUNTER FOR SCREENING FOR COVID-19: ICD-10-CM

## 2021-03-18 DIAGNOSIS — O99.213 OBESITY AFFECTING PREGNANCY IN THIRD TRIMESTER: ICD-10-CM

## 2021-03-28 ENCOUNTER — HOSPITAL ENCOUNTER (INPATIENT)
Age: 31
LOS: 3 days | Discharge: HOME | End: 2021-03-31
Attending: OBSTETRICS & GYNECOLOGY | Admitting: OBSTETRICS & GYNECOLOGY
Payer: COMMERCIAL

## 2021-03-28 ENCOUNTER — APPOINTMENT (OUTPATIENT)
Dept: LABOR AND DELIVERY | Age: 31
End: 2021-03-28
Payer: COMMERCIAL

## 2021-03-28 DIAGNOSIS — G89.18 ACUTE POSTOPERATIVE PAIN: Primary | ICD-10-CM

## 2021-03-28 PROBLEM — Z3A.39 39 WEEKS GESTATION OF PREGNANCY: Status: ACTIVE | Noted: 2021-03-28

## 2021-03-28 LAB
ABO/RH: NORMAL
AMPHETAMINE SCREEN, URINE: NOT DETECTED
ANTIBODY SCREEN: NORMAL
BARBITURATE SCREEN URINE: NOT DETECTED
BENZODIAZEPINE SCREEN, URINE: NOT DETECTED
BUPRENORPHINE URINE: NOT DETECTED
CANNABINOID SCREEN URINE: NOT DETECTED
COCAINE METABOLITE SCREEN URINE: NOT DETECTED
HCT VFR BLD CALC: 34.2 % (ref 34–48)
HEMOGLOBIN: 11.6 G/DL (ref 11.5–15.5)
Lab: NORMAL
MCH RBC QN AUTO: 31.5 PG (ref 26–35)
MCHC RBC AUTO-ENTMCNC: 33.9 % (ref 32–34.5)
MCV RBC AUTO: 92.9 FL (ref 80–99.9)
METHADONE SCREEN, URINE: NOT DETECTED
OPIATE SCREEN URINE: NOT DETECTED
OXYCODONE URINE: NOT DETECTED
PDW BLD-RTO: 14.9 FL (ref 11.5–15)
PHENCYCLIDINE SCREEN URINE: NOT DETECTED
PLATELET # BLD: 210 E9/L (ref 130–450)
PMV BLD AUTO: 11.2 FL (ref 7–12)
RBC # BLD: 3.68 E12/L (ref 3.5–5.5)
WBC # BLD: 9.3 E9/L (ref 4.5–11.5)

## 2021-03-28 PROCEDURE — 86900 BLOOD TYPING SEROLOGIC ABO: CPT

## 2021-03-28 PROCEDURE — 36415 COLL VENOUS BLD VENIPUNCTURE: CPT

## 2021-03-28 PROCEDURE — 86850 RBC ANTIBODY SCREEN: CPT

## 2021-03-28 PROCEDURE — 86901 BLOOD TYPING SEROLOGIC RH(D): CPT

## 2021-03-28 PROCEDURE — 6370000000 HC RX 637 (ALT 250 FOR IP): Performed by: OBSTETRICS & GYNECOLOGY

## 2021-03-28 PROCEDURE — 80307 DRUG TEST PRSMV CHEM ANLYZR: CPT

## 2021-03-28 PROCEDURE — 2580000003 HC RX 258: Performed by: OBSTETRICS & GYNECOLOGY

## 2021-03-28 PROCEDURE — 1220000001 HC SEMI PRIVATE L&D R&B

## 2021-03-28 PROCEDURE — 85027 COMPLETE CBC AUTOMATED: CPT

## 2021-03-28 RX ORDER — SODIUM CHLORIDE 9 MG/ML
25 INJECTION, SOLUTION INTRAVENOUS PRN
Status: DISCONTINUED | OUTPATIENT
Start: 2021-03-28 | End: 2021-03-29

## 2021-03-28 RX ORDER — SODIUM CHLORIDE 0.9 % (FLUSH) 0.9 %
10 SYRINGE (ML) INJECTION PRN
Status: DISCONTINUED | OUTPATIENT
Start: 2021-03-28 | End: 2021-03-29

## 2021-03-28 RX ORDER — SODIUM CHLORIDE, SODIUM LACTATE, POTASSIUM CHLORIDE, CALCIUM CHLORIDE 600; 310; 30; 20 MG/100ML; MG/100ML; MG/100ML; MG/100ML
INJECTION, SOLUTION INTRAVENOUS CONTINUOUS
Status: DISCONTINUED | OUTPATIENT
Start: 2021-03-28 | End: 2021-03-29

## 2021-03-28 RX ORDER — DOCUSATE SODIUM 100 MG/1
100 CAPSULE, LIQUID FILLED ORAL 2 TIMES DAILY
Status: DISCONTINUED | OUTPATIENT
Start: 2021-03-28 | End: 2021-03-29

## 2021-03-28 RX ORDER — ONDANSETRON 2 MG/ML
4 INJECTION INTRAMUSCULAR; INTRAVENOUS EVERY 6 HOURS PRN
Status: DISCONTINUED | OUTPATIENT
Start: 2021-03-28 | End: 2021-03-29 | Stop reason: SDUPTHER

## 2021-03-28 RX ORDER — ACETAMINOPHEN 325 MG/1
650 TABLET ORAL EVERY 4 HOURS PRN
Status: DISCONTINUED | OUTPATIENT
Start: 2021-03-28 | End: 2021-03-29

## 2021-03-28 RX ORDER — SODIUM CHLORIDE 0.9 % (FLUSH) 0.9 %
10 SYRINGE (ML) INJECTION EVERY 12 HOURS SCHEDULED
Status: DISCONTINUED | OUTPATIENT
Start: 2021-03-28 | End: 2021-03-29 | Stop reason: HOSPADM

## 2021-03-28 RX ADMIN — Medication 25 MCG: at 22:55

## 2021-03-28 RX ADMIN — SODIUM CHLORIDE, POTASSIUM CHLORIDE, SODIUM LACTATE AND CALCIUM CHLORIDE: 600; 310; 30; 20 INJECTION, SOLUTION INTRAVENOUS at 22:30

## 2021-03-29 ENCOUNTER — ANESTHESIA (OUTPATIENT)
Dept: LABOR AND DELIVERY | Age: 31
End: 2021-03-29
Payer: COMMERCIAL

## 2021-03-29 ENCOUNTER — ANESTHESIA EVENT (OUTPATIENT)
Dept: LABOR AND DELIVERY | Age: 31
End: 2021-03-29
Payer: COMMERCIAL

## 2021-03-29 PROCEDURE — 6370000000 HC RX 637 (ALT 250 FOR IP): Performed by: ADVANCED PRACTICE MIDWIFE

## 2021-03-29 PROCEDURE — 7200000001 HC VAGINAL DELIVERY

## 2021-03-29 PROCEDURE — 2580000003 HC RX 258: Performed by: ADVANCED PRACTICE MIDWIFE

## 2021-03-29 PROCEDURE — 6370000000 HC RX 637 (ALT 250 FOR IP)

## 2021-03-29 PROCEDURE — 6360000002 HC RX W HCPCS: Performed by: OBSTETRICS & GYNECOLOGY

## 2021-03-29 PROCEDURE — 2500000003 HC RX 250 WO HCPCS: Performed by: ANESTHESIOLOGY

## 2021-03-29 PROCEDURE — 6370000000 HC RX 637 (ALT 250 FOR IP): Performed by: OBSTETRICS & GYNECOLOGY

## 2021-03-29 PROCEDURE — 6360000002 HC RX W HCPCS

## 2021-03-29 PROCEDURE — 3700000025 EPIDURAL BLOCK: Performed by: ANESTHESIOLOGY

## 2021-03-29 PROCEDURE — 2500000003 HC RX 250 WO HCPCS

## 2021-03-29 PROCEDURE — 1220000000 HC SEMI PRIVATE OB R&B

## 2021-03-29 PROCEDURE — 51701 INSERT BLADDER CATHETER: CPT

## 2021-03-29 PROCEDURE — 0KQM0ZZ REPAIR PERINEUM MUSCLE, OPEN APPROACH: ICD-10-PCS | Performed by: OBSTETRICS & GYNECOLOGY

## 2021-03-29 PROCEDURE — 2580000003 HC RX 258: Performed by: OBSTETRICS & GYNECOLOGY

## 2021-03-29 RX ORDER — HYDROCODONE BITARTRATE AND ACETAMINOPHEN 5; 325 MG/1; MG/1
TABLET ORAL
Status: COMPLETED
Start: 2021-03-29 | End: 2021-03-29

## 2021-03-29 RX ORDER — DOCUSATE SODIUM 100 MG/1
100 CAPSULE, LIQUID FILLED ORAL 2 TIMES DAILY
Status: DISCONTINUED | OUTPATIENT
Start: 2021-03-29 | End: 2021-03-31 | Stop reason: HOSPADM

## 2021-03-29 RX ORDER — NALOXONE HYDROCHLORIDE 0.4 MG/ML
0.4 INJECTION, SOLUTION INTRAMUSCULAR; INTRAVENOUS; SUBCUTANEOUS PRN
Status: DISCONTINUED | OUTPATIENT
Start: 2021-03-29 | End: 2021-03-29

## 2021-03-29 RX ORDER — MODIFIED LANOLIN
OINTMENT (GRAM) TOPICAL PRN
Status: DISCONTINUED | OUTPATIENT
Start: 2021-03-29 | End: 2021-03-31 | Stop reason: HOSPADM

## 2021-03-29 RX ORDER — LIDOCAINE HYDROCHLORIDE 10 MG/ML
INJECTION, SOLUTION INFILTRATION; PERINEURAL
Status: COMPLETED
Start: 2021-03-29 | End: 2021-03-29

## 2021-03-29 RX ORDER — HYDROCODONE BITARTRATE AND ACETAMINOPHEN 5; 325 MG/1; MG/1
1 TABLET ORAL EVERY 4 HOURS PRN
Status: DISCONTINUED | OUTPATIENT
Start: 2021-03-29 | End: 2021-03-31 | Stop reason: HOSPADM

## 2021-03-29 RX ORDER — SODIUM CHLORIDE 0.9 % (FLUSH) 0.9 %
10 SYRINGE (ML) INJECTION PRN
Status: DISCONTINUED | OUTPATIENT
Start: 2021-03-29 | End: 2021-03-31 | Stop reason: HOSPADM

## 2021-03-29 RX ORDER — ACETAMINOPHEN 650 MG
TABLET, EXTENDED RELEASE ORAL
Status: COMPLETED
Start: 2021-03-29 | End: 2021-03-29

## 2021-03-29 RX ORDER — NALBUPHINE HCL 10 MG/ML
5 AMPUL (ML) INJECTION EVERY 4 HOURS PRN
Status: DISCONTINUED | OUTPATIENT
Start: 2021-03-29 | End: 2021-03-29

## 2021-03-29 RX ORDER — IBUPROFEN 600 MG/1
600 TABLET ORAL EVERY 6 HOURS PRN
Status: DISCONTINUED | OUTPATIENT
Start: 2021-03-29 | End: 2021-03-31 | Stop reason: HOSPADM

## 2021-03-29 RX ORDER — SODIUM CHLORIDE 0.9 % (FLUSH) 0.9 %
10 SYRINGE (ML) INJECTION EVERY 12 HOURS SCHEDULED
Status: DISCONTINUED | OUTPATIENT
Start: 2021-03-29 | End: 2021-03-31 | Stop reason: HOSPADM

## 2021-03-29 RX ORDER — HYDROCODONE BITARTRATE AND ACETAMINOPHEN 5; 325 MG/1; MG/1
2 TABLET ORAL EVERY 4 HOURS PRN
Status: DISCONTINUED | OUTPATIENT
Start: 2021-03-29 | End: 2021-03-31 | Stop reason: HOSPADM

## 2021-03-29 RX ORDER — ACETAMINOPHEN 325 MG/1
650 TABLET ORAL EVERY 4 HOURS PRN
Status: DISCONTINUED | OUTPATIENT
Start: 2021-03-29 | End: 2021-03-31 | Stop reason: HOSPADM

## 2021-03-29 RX ORDER — ONDANSETRON 2 MG/ML
4 INJECTION INTRAMUSCULAR; INTRAVENOUS EVERY 6 HOURS PRN
Status: DISCONTINUED | OUTPATIENT
Start: 2021-03-29 | End: 2021-03-29

## 2021-03-29 RX ADMIN — LIDOCAINE HYDROCHLORIDE 200 MG: 10 INJECTION, SOLUTION INFILTRATION; PERINEURAL at 14:40

## 2021-03-29 RX ADMIN — DOCUSATE SODIUM 100 MG: 100 CAPSULE, LIQUID FILLED ORAL at 20:05

## 2021-03-29 RX ADMIN — Medication 25 MCG: at 03:24

## 2021-03-29 RX ADMIN — Medication 5 ML: at 08:45

## 2021-03-29 RX ADMIN — Medication: at 20:05

## 2021-03-29 RX ADMIN — HYDROCODONE BITARTRATE AND ACETAMINOPHEN 2 TABLET: 5; 325 TABLET ORAL at 21:43

## 2021-03-29 RX ADMIN — SODIUM CHLORIDE, POTASSIUM CHLORIDE, SODIUM LACTATE AND CALCIUM CHLORIDE: 600; 310; 30; 20 INJECTION, SOLUTION INTRAVENOUS at 05:16

## 2021-03-29 RX ADMIN — SODIUM CHLORIDE, PRESERVATIVE FREE 10 ML: 5 INJECTION INTRAVENOUS at 20:04

## 2021-03-29 RX ADMIN — HYDROCODONE BITARTRATE AND ACETAMINOPHEN 2 TABLET: 5; 325 TABLET ORAL at 15:32

## 2021-03-29 RX ADMIN — Medication 15 ML/HR: at 08:45

## 2021-03-29 RX ADMIN — Medication 30 UNITS: at 10:40

## 2021-03-29 RX ADMIN — Medication 166.7 ML: at 14:39

## 2021-03-29 RX ADMIN — Medication: at 14:30

## 2021-03-29 RX ADMIN — BUTORPHANOL TARTRATE 2 MG: 2 INJECTION, SOLUTION INTRAMUSCULAR; INTRAVENOUS at 05:16

## 2021-03-29 RX ADMIN — IBUPROFEN 600 MG: 600 TABLET, FILM COATED ORAL at 17:12

## 2021-03-29 RX ADMIN — Medication 5 ML: at 08:51

## 2021-03-29 ASSESSMENT — PAIN DESCRIPTION - LOCATION: LOCATION: PERINEUM

## 2021-03-29 ASSESSMENT — PAIN DESCRIPTION - ORIENTATION: ORIENTATION: LOWER

## 2021-03-29 ASSESSMENT — PAIN SCALES - GENERAL
PAINLEVEL_OUTOF10: 6
PAINLEVEL_OUTOF10: 7
PAINLEVEL_OUTOF10: 10
PAINLEVEL_OUTOF10: 2
PAINLEVEL_OUTOF10: 0
PAINLEVEL_OUTOF10: 6

## 2021-03-29 ASSESSMENT — PAIN - FUNCTIONAL ASSESSMENT: PAIN_FUNCTIONAL_ASSESSMENT: ACTIVITIES ARE NOT PREVENTED

## 2021-03-29 ASSESSMENT — PAIN DESCRIPTION - PROGRESSION
CLINICAL_PROGRESSION: GRADUALLY WORSENING
CLINICAL_PROGRESSION: GRADUALLY WORSENING

## 2021-03-29 ASSESSMENT — PAIN DESCRIPTION - PAIN TYPE
TYPE: ACUTE PAIN
TYPE: ACUTE PAIN

## 2021-03-29 ASSESSMENT — PAIN DESCRIPTION - ONSET: ONSET: ON-GOING

## 2021-03-29 NOTE — ANESTHESIA PROCEDURE NOTES
Epidural Block    Patient location during procedure: OB  Start time: 3/29/2021 8:35 AM  End time: 3/29/2021 8:38 AM  Reason for block: labor epidural  Staffing  Performed: resident/CRNA   Resident/CRNA: MAGALI Paiz - ONEAL  Other anesthesia staff: Vikas Cesar, RN  Preanesthetic Checklist  Completed: patient identified, IV checked, site marked, risks and benefits discussed, surgical consent, monitors and equipment checked, pre-op evaluation, timeout performed, anesthesia consent given, oxygen available and patient being monitored  Epidural  Patient position: sitting  Prep: ChloraPrep  Patient monitoring: continuous pulse ox and frequent blood pressure checks  Approach: midline  Location: lumbar (1-5)  Injection technique: AIDA air  Provider prep: mask and sterile gloves  Needle  Needle type: Tuohy   Needle gauge: 18 G  Needle length: 3.5 in  Needle insertion depth: 5.5 cm  Catheter type: end hole  Catheter size: 20 G.   Catheter at skin depth: 12.5 cm  Test dose: negative  Assessment  Hemodynamics: stable  Attempts: 1

## 2021-03-29 NOTE — PROGRESS NOTES
at 39w here for scheduled IOL. Denies any VB, LOF, or pain. Irregular ctxs. GBS-. Orders received from Dr Genaro Irvin, cytotec IOL.

## 2021-03-29 NOTE — PROGRESS NOTES
Dr Lisa Arlington called in, updated on VE, cytotec placement and ctxs. Will proceed with next dose cytotec.

## 2021-03-29 NOTE — FLOWSHEET NOTE
Admitted to room 318 for L & D, via wc. Oriented to nurse call lite system. Oriented to unit informational brochures and [facility] information. Instructed to call for assist when needs up to void for first  Time.  States has had Flu and T-Dap vaccines

## 2021-03-29 NOTE — LACTATION NOTE
Encouraged to offer frequent feedings. Education given on hunger cues. Reviewed signs of adequate I & O;allow baby to feed ad riaz & not to limit time at breast. Discussed ways to awaken baby for feedings including skin to skin. Instructed that baby may also feed 8-12 times a day-cluster feeding at times -as her milk supply is being established. Given Lactation contact & [facility].

## 2021-03-29 NOTE — ANESTHESIA PRE PROCEDURE
Department of Anesthesiology  Preprocedure Note       Name:  Karlene Hare   Age:  31 y.o.  :  1990                                          MRN:  [id number]         Date:  3/29/2021      Surgeon: * No surgeons listed *    Procedure: * No procedures listed *    Medications prior to admission:   Prior to Admission medications    Medication Sig Start Date End Date Taking?  Authorizing Provider   Prenatal Vit-Fe Fumarate-FA (PRENATAL PO) Take by mouth   Yes Historical Provider, MD   famotidine (PEPCID) 20 MG tablet Take 1 tablet by mouth 2 times daily  Patient not taking: Reported on 3/12/2021 8/24/20   Samantha Boast, MD       Current medications:    Current Facility-Administered Medications   Medication Dose Route Frequency Provider Last Rate Last Admin    lactated ringers infusion   Intravenous Continuous Samantha Boast,  mL/hr at 21 0516 New Bag at 21 0516    sodium chloride flush 0.9 % injection 10 mL  10 mL Intravenous 2 times per day Samantha Boast, MD        sodium chloride flush 0.9 % injection 10 mL  10 mL Intravenous PRN Samantha Boast, MD        0.9 % sodium chloride infusion  25 mL Intravenous PRN Samantha Boast, MD        ondansetron (ZOFRAN) injection 4 mg  4 mg Intravenous Q6H PRN Samantha Boast, MD        acetaminophen (TYLENOL) tablet 650 mg  650 mg Oral Q4H PRN Samantha Boast, MD        benzocaine-menthol (DERMOPLAST) 20-0.5 % spray   Topical PRN Samantha Boast, MD        docusate sodium (COLACE) capsule 100 mg  100 mg Oral BID Samantha Boast, MD        butorphanol (STADOL) injection 2 mg  2 mg Intravenous Q3H PRN Samantha Boast, MD   2 mg at 21 0516    miSOPROStol (CYTOTEC) pre-split tablet TABS 25 mcg  25 mcg Vaginal Q4H Samantha Boast, MD   25 mcg at 21 1688       Allergies:  No Known Allergies    Problem List:    Patient Active Problem List   Diagnosis Code    Prenatal care in third trimester Z34.93    Obesity affecting pregnancy in third trimester O99.213    39 weeks gestation of pregnancy Z3A.39       Past Medical History:        Diagnosis Date    Anxiety        Past Surgical History:        Procedure Laterality Date    TONSILLECTOMY      WISDOM TOOTH EXTRACTION         Social History:    Social History     Tobacco Use    Smoking status: Never Smoker    Smokeless tobacco: Never Used   Substance Use Topics    Alcohol use: Not Currently     Comment: SOCIALLY                                Counseling given: Not Answered      Vital Signs (Current):   Vitals:    03/28/21 2215 03/28/21 2222 03/29/21 0324   BP:  126/78 (!) 111/59   Pulse:  93 71   Resp:  16 16   Temp:  36.8 °C (98.2 °F) 36.5 °C (97.7 °F)   TempSrc:  Oral Oral   Weight: 234 lb (106.1 kg)     Height: 5' 7\" (1.702 m)                                                BP Readings from Last 3 Encounters:   03/29/21 (!) 111/59   03/24/21 113/76   03/17/21 108/76       NPO Status:                                                                                 BMI:   Wt Readings from Last 3 Encounters:   03/28/21 234 lb (106.1 kg)   03/24/21 234 lb (106.1 kg)   03/17/21 233 lb (105.7 kg)     Body mass index is 36.65 kg/m². CBC:   Lab Results   Component Value Date    WBC 9.3 03/28/2021    RBC 3.68 03/28/2021    HGB 11.6 03/28/2021    HCT 34.2 03/28/2021    MCV 92.9 03/28/2021    RDW 14.9 03/28/2021     03/28/2021       CMP:   Lab Results   Component Value Date     08/14/2020    K 3.9 08/14/2020     08/14/2020    CO2 20 08/14/2020    BUN 8 08/14/2020    CREATININE 0.7 08/14/2020    GFRAA >60 08/14/2020    LABGLOM >60 08/14/2020    GLUCOSE 126 01/13/2021    GLUCOSE 79 08/14/2020    PROT 6.5 01/14/2019    CALCIUM 8.9 08/14/2020    BILITOT 0.4 01/14/2019    ALKPHOS 58 01/14/2019    AST 13 01/14/2019    ALT 12 01/14/2019       POC Tests: No results for input(s): POCGLU, POCNA, POCK, POCCL, POCBUN, POCHEMO, POCHCT in the last 72 hours.     Coags: No results found for: PROTIME, INR, APTT    HCG (If Applicable):   Lab Results   Component Value Date    PREGTESTUR pos 08/24/2020        ABGs: No results found for: PHART, PO2ART, EAZ0QEN, HNH1NTT, BEART, E4SJSDQD     Type & Screen (If Applicable):  No results found for: LABABO, LABRH    Drug/Infectious Status (If Applicable):  Lab Results   Component Value Date    HEPCAB NON REACT 01/27/2014       COVID-19 Screening (If Applicable):   Lab Results   Component Value Date    COVID19 Not Detected 03/17/2021           Anesthesia Evaluation  Patient summary reviewed and Nursing notes reviewed no history of anesthetic complications:   Airway: Mallampati: II  TM distance: >3 FB   Neck ROM: full  Mouth opening: > = 3 FB Dental:          Pulmonary: breath sounds clear to auscultation            Patient did not smoke on day of surgery. ROS comment: covid negativeat 36 weeks    Head cold since march   Cardiovascular:            Rhythm: regular  Rate: normal                    Neuro/Psych:   (+) depression/anxiety  (not taking medication)            GI/Hepatic/Renal:   (+) GERD (otc):,          ROS comment: Tonsillectomy  [place] tooth extraction. Endo/Other: Negative Endo/Other ROS                    Abdominal:           Vascular:                                      Anesthesia Plan      general, epidural and spinal     ASA 2     (Risks and benefits discussed agree to proceed with proceed)        Anesthetic plan and risks discussed with patient and spouse. Use of blood products discussed with patient whom consented to blood products. Plan discussed with attending.                 MAGALI Patel - CRNA   3/29/2021

## 2021-03-29 NOTE — H&P
CHIEF COMPLAINT:  Here for IOL    HISTORY OF PRESENT ILLNESS:      The patient is a 31 y.o. female at 36w3d. OB History        1    Para   0    Term   0       0    AB   0    Living   0       SAB   0    TAB   0    Ectopic   0    Molar        Multiple   0    Live Births                Patient presents with a chief complaint as above and is being admitted for induction    Estimated Due Date: Estimated Date of Delivery: 21    PRENATAL CARE:    Complicated by: none    PAST OB HISTORY  OB History        1    Para   0    Term   0       0    AB   0    Living   0       SAB   0    TAB   0    Ectopic   0    Molar        Multiple   0    Live Births                    Past Medical History:        Diagnosis Date    Anxiety      Past Surgical History:        Procedure Laterality Date    TONSILLECTOMY      WISDOM TOOTH EXTRACTION       Allergies:  Patient has no known allergies.   Social History:    Social History     Socioeconomic History    Marital status: Single     Spouse name: Not on file    Number of children: Not on file    Years of education: Not on file    Highest education level: Not on file   Occupational History    Not on file   Social Needs    Financial resource strain: Not on file    Food insecurity     Worry: Not on file     Inability: Not on file    Transportation needs     Medical: Not on file     Non-medical: Not on file   Tobacco Use    Smoking status: Never Smoker    Smokeless tobacco: Never Used   Substance and Sexual Activity    Alcohol use: Not Currently     Comment: SOCIALLY    Drug use: No    Sexual activity: Yes     Partners: Male     Birth control/protection: Pill   Lifestyle    Physical activity     Days per week: Not on file     Minutes per session: Not on file    Stress: Not on file   Relationships    Social connections     Talks on phone: Not on file     Gets together: Not on file     Attends Shinto service: Not on file     Active member of club or organization: Not on file     Attends meetings of clubs or organizations: Not on file     Relationship status: Not on file    Intimate partner violence     Fear of current or ex partner: Not on file     Emotionally abused: Not on file     Physically abused: Not on file     Forced sexual activity: Not on file   Other Topics Concern    Not on file   Social History Narrative    Not on file     Family History:       Problem Relation Age of Onset    Breast Cancer Maternal Grandmother     Lung Cancer Father     Colon Cancer Father     Diabetes Maternal Aunt     Diabetes Paternal Aunt     Depression Mother      Medications Prior to Admission:  Medications Prior to Admission: Prenatal Vit-Fe Fumarate-FA (PRENATAL PO), Take by mouth  famotidine (PEPCID) 20 MG tablet, Take 1 tablet by mouth 2 times daily (Patient not taking: Reported on 3/12/2021)    REVIEW OF SYSTEMS:    CONSTITUTIONAL:  negative  RESPIRATORY:  negative  CARDIOVASCULAR:  negative  GASTROINTESTINAL:  negative  ALLERGIC/IMMUNOLOGIC:  negative  NEUROLOGICAL:  negative  BEHAVIOR/PSYCH:  negative    PHYSICAL EXAM:  Vitals:    03/28/21 2215 03/28/21 2222 03/29/21 0324 03/29/21 0734   BP:  126/78 (!) 111/59 (!) 134/92   Pulse:  93 71 79   Resp:  16 16 16   Temp:  98.2 °F (36.8 °C) 97.7 °F (36.5 °C) 98.7 °F (37.1 °C)   TempSrc:  Oral Oral Oral   Weight: 234 lb (106.1 kg)      Height: 5' 7\" (1.702 m)        General appearance:  awake, alert, cooperative, no apparent distress, and appears stated age  Neurologic:  Awake, alert, oriented to name, place and time. Lungs:  No increased work of breathing, good air exchange  Abdomen:  Soft, non tender, gravid, consistent with her gestational age   Fetal heart rate:  Reassuring.   Pelvis:  Adequate pelvis  Cervix: 1 cm 50% medium -3  Contraction frequency:  0 minutes    Membranes:  Intact    ASSESSMENT AND PLAN:  IUP ar term  Here for IOL    Labor: Admit,  routine labor orders  Fetus: Reassuring  GBS: No  Other: IV hydration and antiemetics, plan cytotec for cervical ripening and labor induction, pitocin, NPO after midnight, R, B, A and possible complications discussed

## 2021-03-30 LAB
HCT VFR BLD CALC: 32.8 % (ref 34–48)
HEMOGLOBIN: 10.7 G/DL (ref 11.5–15.5)
MCH RBC QN AUTO: 30.8 PG (ref 26–35)
MCHC RBC AUTO-ENTMCNC: 32.6 % (ref 32–34.5)
MCV RBC AUTO: 94.5 FL (ref 80–99.9)
PDW BLD-RTO: 15.3 FL (ref 11.5–15)
PLATELET # BLD: 172 E9/L (ref 130–450)
PMV BLD AUTO: 11.2 FL (ref 7–12)
RBC # BLD: 3.47 E12/L (ref 3.5–5.5)
WBC # BLD: 10.2 E9/L (ref 4.5–11.5)

## 2021-03-30 PROCEDURE — 36415 COLL VENOUS BLD VENIPUNCTURE: CPT

## 2021-03-30 PROCEDURE — 1220000000 HC SEMI PRIVATE OB R&B

## 2021-03-30 PROCEDURE — 85027 COMPLETE CBC AUTOMATED: CPT

## 2021-03-30 PROCEDURE — 6370000000 HC RX 637 (ALT 250 FOR IP): Performed by: ADVANCED PRACTICE MIDWIFE

## 2021-03-30 PROCEDURE — 2580000003 HC RX 258: Performed by: ADVANCED PRACTICE MIDWIFE

## 2021-03-30 RX ORDER — HYDROCODONE BITARTRATE AND ACETAMINOPHEN 5; 325 MG/1; MG/1
1 TABLET ORAL EVERY 6 HOURS PRN
Qty: 20 TABLET | Refills: 0 | Status: SHIPPED | OUTPATIENT
Start: 2021-03-30 | End: 2021-04-06

## 2021-03-30 RX ORDER — IBUPROFEN 800 MG/1
800 TABLET ORAL EVERY 8 HOURS PRN
Qty: 30 TABLET | Refills: 0 | Status: SHIPPED | OUTPATIENT
Start: 2021-03-30 | End: 2021-08-16

## 2021-03-30 RX ADMIN — DOCUSATE SODIUM 100 MG: 100 CAPSULE, LIQUID FILLED ORAL at 19:52

## 2021-03-30 RX ADMIN — IBUPROFEN 600 MG: 600 TABLET, FILM COATED ORAL at 22:53

## 2021-03-30 RX ADMIN — HYDROCODONE BITARTRATE AND ACETAMINOPHEN 2 TABLET: 5; 325 TABLET ORAL at 08:52

## 2021-03-30 RX ADMIN — HYDROCODONE BITARTRATE AND ACETAMINOPHEN 2 TABLET: 5; 325 TABLET ORAL at 02:46

## 2021-03-30 RX ADMIN — HYDROCODONE BITARTRATE AND ACETAMINOPHEN 2 TABLET: 5; 325 TABLET ORAL at 13:33

## 2021-03-30 RX ADMIN — IBUPROFEN 600 MG: 600 TABLET, FILM COATED ORAL at 10:19

## 2021-03-30 RX ADMIN — DOCUSATE SODIUM 100 MG: 100 CAPSULE, LIQUID FILLED ORAL at 08:49

## 2021-03-30 RX ADMIN — HYDROCODONE BITARTRATE AND ACETAMINOPHEN 2 TABLET: 5; 325 TABLET ORAL at 19:52

## 2021-03-30 RX ADMIN — IBUPROFEN 600 MG: 600 TABLET, FILM COATED ORAL at 16:23

## 2021-03-30 RX ADMIN — SODIUM CHLORIDE, PRESERVATIVE FREE 10 ML: 5 INJECTION INTRAVENOUS at 08:49

## 2021-03-30 RX ADMIN — BENZOCAINE AND LEVOMENTHOL: 200; 5 SPRAY TOPICAL at 10:20

## 2021-03-30 ASSESSMENT — PAIN SCALES - GENERAL
PAINLEVEL_OUTOF10: 8
PAINLEVEL_OUTOF10: 3
PAINLEVEL_OUTOF10: 6

## 2021-03-30 NOTE — PROGRESS NOTES
Postpartum Day 1: Vaginal Delivery    The patient feels well. Pain is well controlled with current medications. Baby is feeding via breast.     Objective:        Vitals:    03/29/21 2331   BP: (!) 113/56   Pulse: 92   Resp: 16   Temp: 98.5 °F (36.9 °C)         Lab Results   Component Value Date    WBC 9.3 03/28/2021    HGB 11.6 03/28/2021    HCT 34.2 03/28/2021    MCV 92.9 03/28/2021     03/28/2021       General:    alert, appears stated age and cooperative   Lochia:  appropriate   Uterine    firm   DVT Evaluation:  No evidence of DVT seen on physical exam.     Assessment:     Status post Vaginal Delivery. good    Plan:     Continue current care.

## 2021-03-30 NOTE — FLOWSHEET NOTE
Up to bathroom, voided large amount. pericare done. With voiced understanding of instructions. Returned to bed without incident.

## 2021-03-30 NOTE — ANESTHESIA POSTPROCEDURE EVALUATION
Department of Anesthesiology  Postprocedure Note    Patient: Terese Mclean  MRN: [id number]  YOB: 1990  Date of evaluation: 3/30/2021  Time:  8:58 AM     Procedure Summary     Date: 03/29/21 Room / Location:     Anesthesia Start: 0827 Anesthesia Stop:     Procedure: Labor Analgesia Diagnosis:     Scheduled Providers:  Responsible Provider: Poppy Frank MD    Anesthesia Type: general, epidural, spinal ASA Status: 2          Anesthesia Type: No value filed. Rahul Phase I:      Rahul Phase II:      Last vitals: Reviewed and per EMR flowsheets.        Anesthesia Post Evaluation    Patient location during evaluation: bedside  Patient participation: complete - patient participated  Level of consciousness: awake and alert  Pain score: 2  Airway patency: patent  Nausea & Vomiting: no nausea and no vomiting  Complications: no  Cardiovascular status: blood pressure returned to baseline and hemodynamically stable  Respiratory status: acceptable  Hydration status: stable

## 2021-03-30 NOTE — PROGRESS NOTES
[employer] [place] Hearing screening results were discussed with parent. Questions answered. Brochure given to parent. Advised to monitor developmental milestones and contact physician for any concerns.    Santhosh Galloway

## 2021-03-30 NOTE — LACTATION NOTE
Nursing & supplementing. Encouraged to offer frequent feedings, offer breast first to help establish her milk supply.

## 2021-03-31 VITALS
RESPIRATION RATE: 16 BRPM | WEIGHT: 234 LBS | BODY MASS INDEX: 36.73 KG/M2 | SYSTOLIC BLOOD PRESSURE: 109 MMHG | TEMPERATURE: 98.2 F | HEIGHT: 67 IN | DIASTOLIC BLOOD PRESSURE: 69 MMHG | HEART RATE: 76 BPM

## 2021-03-31 PROCEDURE — 6370000000 HC RX 637 (ALT 250 FOR IP): Performed by: ADVANCED PRACTICE MIDWIFE

## 2021-03-31 RX ADMIN — IBUPROFEN 600 MG: 600 TABLET, FILM COATED ORAL at 10:53

## 2021-03-31 RX ADMIN — DOCUSATE SODIUM 100 MG: 100 CAPSULE, LIQUID FILLED ORAL at 07:53

## 2021-03-31 RX ADMIN — HYDROCODONE BITARTRATE AND ACETAMINOPHEN 2 TABLET: 5; 325 TABLET ORAL at 07:54

## 2021-03-31 ASSESSMENT — PAIN SCALES - GENERAL
PAINLEVEL_OUTOF10: 8
PAINLEVEL_OUTOF10: 8

## 2021-03-31 NOTE — PLAN OF CARE
Problem: Pain - Acute:  Goal: Pain level will decrease  Description: Pain level will decrease  Outcome: Met This Shift     Problem: Mood - Altered:  Goal: Mood stable  Description: Mood stable  Outcome: Met This Shift     Problem: Infection - Risk of, Puerperal Infection:  Goal: Will show no infection signs and symptoms  Description: Will show no infection signs and symptoms  Outcome: Met This Shift     Problem: Fluid Volume - Imbalance:  Goal: Absence of imbalanced fluid volume signs and symptoms  Description: Absence of imbalanced fluid volume signs and symptoms  Outcome: Met This Shift  Goal: Absence of postpartum hemorrhage signs and symptoms  Description: Absence of postpartum hemorrhage signs and symptoms  Outcome: Met This Shift     Problem: Constipation:  Goal: Bowel elimination is within specified parameters  Description: Bowel elimination is within specified parameters  Outcome: Met This Shift

## 2021-03-31 NOTE — LACTATION NOTE
Mom struggling to latch baby due to supplementing with formula. Tips given to improve and sustain latch at home, encouraged mom to call if an outpatient consult is needed. Encouraged frequent feeds to establish milk supply. Reviewed benefits and safety of skin to skin. Inst on adequate I/O and importance of keeping track of diapers at home. Instructed on signs of dehydration such as infant refusing to feed, decreased wet diapers and infant becoming listless and notify provider if these occur. Reviewed with mom the importance of notifying the physician if baby looks more jaundiced. Lactation office # given if follow-up needed, as well as other helpful resources. Encouraged to call with any concerns. Support and encouragement given.

## 2021-03-31 NOTE — FLOWSHEET NOTE
Patient instructed on discharge instructions with verbalized understanding. Questions answered prn. Patient signed discharge instructions and was given a copy for her records. Final ID band check completed with patient and infant. Correct ID confirmed. Hugs tag disabled and removed.

## 2021-03-31 NOTE — FLOWSHEET NOTE
Patient discharged to home in stable condition via wheelchair carrying infant on her lap in car seat.  Patient and infant accompanied by father of infant

## 2021-03-31 NOTE — PROGRESS NOTES
Postpartum Day 2: Vaginal Delivery    The patient feels well. Pain is well controlled with current medications. Baby is feeding via breast.     Objective:        Vitals:    03/31/21 0722   BP: 109/69   Pulse: 76   Resp:    Temp: 98.2 °F (36.8 °C)         Lab Results   Component Value Date    WBC 10.2 03/30/2021    HGB 10.7 (L) 03/30/2021    HCT 32.8 (L) 03/30/2021    MCV 94.5 03/30/2021     03/30/2021       General:    alert, appears stated age and cooperative   Lochia:  appropriate   Uterine    firm   DVT Evaluation:  No evidence of DVT seen on physical exam.  Calf/Ankle edema is present. Assessment:     Status post Vaginal Delivery. good    Plan:     Discharge home with standard precautions and return to clinic in 4-6 weeks.

## 2021-03-31 NOTE — PROGRESS NOTES
CLINICAL PHARMACY NOTE: MEDS TO [address] Select Patient?: Yes  Total # of Prescriptions Filled: 2   The following medications were delivered to the patient:  · norco 5/ 325 mg  · Ibuprofen 800 mg     Total # of Interventions Completed: 2  Time Spent (min): 30    Additional Documentation:

## 2021-09-14 LAB
CHOLESTEROL, TOTAL: 209 MG/DL (ref 0–199)
GLUCOSE BLD-MCNC: 88 MG/DL (ref 74–107)
HDLC SERPL-MCNC: 60 MG/DL
LDL CHOLESTEROL CALCULATED: 118 MG/DL (ref 0–99)
TRIGL SERPL-MCNC: 157 MG/DL (ref 0–149)

## 2022-04-04 DIAGNOSIS — N92.6 IRREGULAR MENSES: ICD-10-CM

## 2022-04-04 DIAGNOSIS — Z11.3 SCREENING EXAMINATION FOR VENEREAL DISEASE: ICD-10-CM

## 2022-04-04 DIAGNOSIS — R30.0 DYSURIA: ICD-10-CM

## 2022-04-07 LAB
Lab: NORMAL
REPORT: NORMAL
THIS TEST SENT TO: NORMAL
URINE CULTURE, ROUTINE: NORMAL

## 2022-05-04 DIAGNOSIS — Z34.81 PRENATAL CARE, SUBSEQUENT PREGNANCY, FIRST TRIMESTER: ICD-10-CM

## 2022-05-04 LAB
HCT VFR BLD CALC: 40.4 % (ref 34–48)
HEMOGLOBIN: 13.3 G/DL (ref 11.5–15.5)
MCH RBC QN AUTO: 31.2 PG (ref 26–35)
MCHC RBC AUTO-ENTMCNC: 32.9 % (ref 32–34.5)
MCV RBC AUTO: 94.8 FL (ref 80–99.9)
PDW BLD-RTO: 13.2 FL (ref 11.5–15)
PLATELET # BLD: 273 E9/L (ref 130–450)
PMV BLD AUTO: 10.9 FL (ref 7–12)
RBC # BLD: 4.26 E12/L (ref 3.5–5.5)
TSH SERPL DL<=0.05 MIU/L-ACNC: 2.7 UIU/ML (ref 0.27–4.2)
WBC # BLD: 8.8 E9/L (ref 4.5–11.5)

## 2022-05-05 LAB
ABO/RH: NORMAL
ANTIBODY SCREEN: NORMAL
HEPATITIS B SURFACE ANTIGEN INTERPRETATION: NORMAL
RPR: NORMAL
RUBELLA ANTIBODY IGG: NORMAL
VARICELLA-ZOSTER VIRUS AB, IGG: NORMAL

## 2022-06-01 DIAGNOSIS — O09.899 CYSTIC FIBROSIS CARRIER, ANTEPARTUM: ICD-10-CM

## 2022-06-01 DIAGNOSIS — Z34.82 PRENATAL CARE, SUBSEQUENT PREGNANCY, SECOND TRIMESTER: ICD-10-CM

## 2022-06-01 DIAGNOSIS — Z14.1 CYSTIC FIBROSIS CARRIER, ANTEPARTUM: ICD-10-CM

## 2022-06-01 DIAGNOSIS — O99.212 MATERNAL OBESITY, ANTEPARTUM, SECOND TRIMESTER: ICD-10-CM

## 2022-06-01 PROBLEM — Z34.93 PRENATAL CARE IN THIRD TRIMESTER: Status: RESOLVED | Noted: 2021-01-27 | Resolved: 2022-06-01

## 2022-06-01 PROBLEM — Z3A.39 39 WEEKS GESTATION OF PREGNANCY: Status: RESOLVED | Noted: 2021-03-28 | Resolved: 2022-06-01

## 2022-06-01 PROBLEM — O99.213 OBESITY AFFECTING PREGNANCY IN THIRD TRIMESTER: Status: RESOLVED | Noted: 2021-03-17 | Resolved: 2022-06-01

## 2022-06-04 LAB
AFP INTERPRETATION: NORMAL
AFP MOM: 0.67
AFP SPECIMEN: NORMAL
D-INHIBIN: 49 PG/ML
DATING: NORMAL
EER MATERNAL SCREEN AFP, HCG, EST, INH: NORMAL
ESTIMATED DUE DATE: NORMAL
FETUS COUNT: NORMAL
GESTATIONAL AGE CALC AT COLLECT: NORMAL
HISTORY OF ANEUPLOIDY?: NO
HISTORY/NEURAL TUBE DEFECTS: NO
INSULIN DEP. DIABETIC: NO
MATERNAL AGE AT EDD: 33 YR
MATERNAL WEIGHT: NORMAL
MOM FOR HCG, 2ND TRIMESTER: 0.73
MOM FOR UE3: 0.96
MOM INHIBN: 0.36
PATIENT'S HCG, 2ND TRIMESTER: NORMAL IU/L
PT AFP: 16 NG/ML
PT UE3: 0.68 NG/ML
RACE: NORMAL
SMOKING: NO

## 2022-08-25 DIAGNOSIS — Z34.82 PRENATAL CARE, SUBSEQUENT PREGNANCY, SECOND TRIMESTER: ICD-10-CM

## 2022-08-25 LAB
GLUCOSE TOLERANCE SCREEN 50G: 132 MG/DL (ref 70–140)
HCT VFR BLD CALC: 40.9 % (ref 34–48)
HEMOGLOBIN: 12.9 G/DL (ref 11.5–15.5)
MCH RBC QN AUTO: 31.9 PG (ref 26–35)
MCHC RBC AUTO-ENTMCNC: 31.5 % (ref 32–34.5)
MCV RBC AUTO: 101.2 FL (ref 80–99.9)
PDW BLD-RTO: 14.8 FL (ref 11.5–15)
PLATELET # BLD: 217 E9/L (ref 130–450)
PMV BLD AUTO: 11 FL (ref 7–12)
RBC # BLD: 4.04 E12/L (ref 3.5–5.5)
WBC # BLD: 9.2 E9/L (ref 4.5–11.5)

## 2022-08-26 LAB
ANTIBODY SCREEN: NORMAL
RPR: NORMAL

## 2022-10-11 NOTE — TELEPHONE ENCOUNTER
Dr. Nichol Philippe, patient interested in free prenatal vitamins and iron per response from [id number] [facility]. Order for [facility] Baby Box pending for your convenience. Thank you,  Rebecca Barros. Short, PharmD  [address] [facility]  Department, toll free: [phone]  [address]      =======================================================================    [place] REVIEW - Be Well With Baby    SUBJECTIVE  Meera Batres is a 27 y.o. female currently identified as interested in receiving a [facility] \"Baby Box\" containing a 1 year supply of prenatal vitamins from [facility]. Contents of Baby Box:  Welcome Letter  6 month supply of Nature's Blend Prenatal Multivitamin with Minerals (Take 1 softgel once daily) with 1 refill    Thank you  Ana Kirk, PharmD  [address] [facility]  Department, toll free: [phone]  [address]

## 2022-10-19 DIAGNOSIS — O99.213 OBESITY AFFECTING PREGNANCY IN THIRD TRIMESTER: ICD-10-CM

## 2022-10-19 DIAGNOSIS — Z34.93 PRENATAL CARE, THIRD TRIMESTER: ICD-10-CM

## 2022-10-23 LAB — GROUP B STREP CULTURE: NORMAL

## 2022-10-29 LAB
Lab: NORMAL
REPORT: NORMAL
THIS TEST SENT TO: NORMAL

## 2022-11-09 ENCOUNTER — ANESTHESIA EVENT (OUTPATIENT)
Dept: LABOR AND DELIVERY | Age: 32
End: 2022-11-09
Payer: COMMERCIAL

## 2022-11-09 ENCOUNTER — HOSPITAL ENCOUNTER (INPATIENT)
Age: 32
LOS: 2 days | Discharge: HOME | End: 2022-11-11
Attending: OBSTETRICS & GYNECOLOGY | Admitting: OBSTETRICS & GYNECOLOGY
Payer: COMMERCIAL

## 2022-11-09 ENCOUNTER — ANESTHESIA (OUTPATIENT)
Dept: LABOR AND DELIVERY | Age: 32
End: 2022-11-09
Payer: COMMERCIAL

## 2022-11-09 PROBLEM — Z3A.38 38 WEEKS GESTATION OF PREGNANCY: Status: ACTIVE | Noted: 2022-11-09

## 2022-11-09 PROBLEM — Z37.9 NORMAL LABOR: Status: ACTIVE | Noted: 2022-11-09

## 2022-11-09 LAB
ABO/RH: NORMAL
AMPHETAMINE SCREEN, URINE: NOT DETECTED
ANTIBODY SCREEN: NORMAL
BARBITURATE SCREEN URINE: NOT DETECTED
BENZODIAZEPINE SCREEN, URINE: NOT DETECTED
CANNABINOID SCREEN URINE: NOT DETECTED
COCAINE METABOLITE SCREEN URINE: NOT DETECTED
FENTANYL SCREEN, URINE: NOT DETECTED
HCT VFR BLD CALC: 38.3 % (ref 34–48)
HEMOGLOBIN: 13.2 G/DL (ref 11.5–15.5)
Lab: NORMAL
MCH RBC QN AUTO: 31.4 PG (ref 26–35)
MCHC RBC AUTO-ENTMCNC: 34.5 % (ref 32–34.5)
MCV RBC AUTO: 91.2 FL (ref 80–99.9)
METHADONE SCREEN, URINE: NOT DETECTED
OPIATE SCREEN URINE: NOT DETECTED
OXYCODONE URINE: NOT DETECTED
PDW BLD-RTO: 14.5 FL (ref 11.5–15)
PHENCYCLIDINE SCREEN URINE: NOT DETECTED
PLATELET # BLD: 244 E9/L (ref 130–450)
PMV BLD AUTO: 11.2 FL (ref 7–12)
RAPID HIV 1&2: NORMAL
RBC # BLD: 4.2 E12/L (ref 3.5–5.5)
WBC # BLD: 10.3 E9/L (ref 4.5–11.5)

## 2022-11-09 PROCEDURE — 6360000002 HC RX W HCPCS: Performed by: OBSTETRICS & GYNECOLOGY

## 2022-11-09 PROCEDURE — 80307 DRUG TEST PRSMV CHEM ANLYZR: CPT

## 2022-11-09 PROCEDURE — 36415 COLL VENOUS BLD VENIPUNCTURE: CPT

## 2022-11-09 PROCEDURE — 1220000001 HC SEMI PRIVATE L&D R&B

## 2022-11-09 PROCEDURE — 2500000003 HC RX 250 WO HCPCS: Performed by: ANESTHESIOLOGY

## 2022-11-09 PROCEDURE — 6370000000 HC RX 637 (ALT 250 FOR IP)

## 2022-11-09 PROCEDURE — 85027 COMPLETE CBC AUTOMATED: CPT

## 2022-11-09 PROCEDURE — 3700000025 EPIDURAL BLOCK: Performed by: ANESTHESIOLOGY

## 2022-11-09 PROCEDURE — 99221 1ST HOSP IP/OBS SF/LOW 40: CPT | Performed by: NURSE PRACTITIONER

## 2022-11-09 PROCEDURE — 86901 BLOOD TYPING SEROLOGIC RH(D): CPT

## 2022-11-09 PROCEDURE — 7200000001 HC VAGINAL DELIVERY

## 2022-11-09 PROCEDURE — 86701 HIV-1ANTIBODY: CPT

## 2022-11-09 PROCEDURE — 86900 BLOOD TYPING SEROLOGIC ABO: CPT

## 2022-11-09 PROCEDURE — 86850 RBC ANTIBODY SCREEN: CPT

## 2022-11-09 RX ORDER — SODIUM CHLORIDE 9 MG/ML
INJECTION, SOLUTION INTRAVENOUS PRN
Status: DISCONTINUED | OUTPATIENT
Start: 2022-11-09 | End: 2022-11-11 | Stop reason: HOSPADM

## 2022-11-09 RX ORDER — IBUPROFEN 800 MG/1
800 TABLET ORAL EVERY 8 HOURS PRN
Status: DISCONTINUED | OUTPATIENT
Start: 2022-11-09 | End: 2022-11-11 | Stop reason: HOSPADM

## 2022-11-09 RX ORDER — FERROUS SULFATE 325(65) MG
325 TABLET ORAL
Status: DISCONTINUED | OUTPATIENT
Start: 2022-11-10 | End: 2022-11-11 | Stop reason: HOSPADM

## 2022-11-09 RX ORDER — ONDANSETRON 2 MG/ML
4 INJECTION INTRAMUSCULAR; INTRAVENOUS EVERY 6 HOURS PRN
Status: DISCONTINUED | OUTPATIENT
Start: 2022-11-09 | End: 2022-11-09

## 2022-11-09 RX ORDER — SODIUM CHLORIDE, SODIUM LACTATE, POTASSIUM CHLORIDE, AND CALCIUM CHLORIDE .6; .31; .03; .02 G/100ML; G/100ML; G/100ML; G/100ML
1000 INJECTION, SOLUTION INTRAVENOUS PRN
Status: DISCONTINUED | OUTPATIENT
Start: 2022-11-09 | End: 2022-11-09

## 2022-11-09 RX ORDER — ONDANSETRON 4 MG/1
4 TABLET, ORALLY DISINTEGRATING ORAL EVERY 6 HOURS PRN
Status: DISCONTINUED | OUTPATIENT
Start: 2022-11-09 | End: 2022-11-11 | Stop reason: HOSPADM

## 2022-11-09 RX ORDER — BISACODYL 10 MG
10 SUPPOSITORY, RECTAL RECTAL DAILY PRN
Status: DISCONTINUED | OUTPATIENT
Start: 2022-11-09 | End: 2022-11-11 | Stop reason: HOSPADM

## 2022-11-09 RX ORDER — DOCUSATE SODIUM 100 MG/1
100 CAPSULE, LIQUID FILLED ORAL 2 TIMES DAILY
Status: DISCONTINUED | OUTPATIENT
Start: 2022-11-10 | End: 2022-11-11 | Stop reason: HOSPADM

## 2022-11-09 RX ORDER — CARBOPROST TROMETHAMINE 250 UG/ML
250 INJECTION, SOLUTION INTRAMUSCULAR PRN
Status: DISCONTINUED | OUTPATIENT
Start: 2022-11-09 | End: 2022-11-09

## 2022-11-09 RX ORDER — ACETAMINOPHEN 500 MG
1000 TABLET ORAL EVERY 8 HOURS PRN
Status: DISCONTINUED | OUTPATIENT
Start: 2022-11-09 | End: 2022-11-11 | Stop reason: HOSPADM

## 2022-11-09 RX ORDER — NALOXONE HYDROCHLORIDE 0.4 MG/ML
INJECTION, SOLUTION INTRAMUSCULAR; INTRAVENOUS; SUBCUTANEOUS PRN
Status: DISCONTINUED | OUTPATIENT
Start: 2022-11-09 | End: 2022-11-09

## 2022-11-09 RX ORDER — PANTOPRAZOLE SODIUM 40 MG/1
40 TABLET, DELAYED RELEASE ORAL DAILY PRN
Status: DISCONTINUED | OUTPATIENT
Start: 2022-11-09 | End: 2022-11-11 | Stop reason: HOSPADM

## 2022-11-09 RX ORDER — LIDOCAINE HYDROCHLORIDE 10 MG/ML
INJECTION, SOLUTION INFILTRATION; PERINEURAL
Status: DISCONTINUED
Start: 2022-11-09 | End: 2022-11-09

## 2022-11-09 RX ORDER — IBUPROFEN 800 MG/1
TABLET ORAL
Status: COMPLETED
Start: 2022-11-09 | End: 2022-11-09

## 2022-11-09 RX ORDER — SODIUM CHLORIDE 0.9 % (FLUSH) 0.9 %
5-40 SYRINGE (ML) INJECTION EVERY 12 HOURS SCHEDULED
Status: DISCONTINUED | OUTPATIENT
Start: 2022-11-10 | End: 2022-11-11 | Stop reason: HOSPADM

## 2022-11-09 RX ORDER — MODIFIED LANOLIN
OINTMENT (GRAM) TOPICAL PRN
Status: DISCONTINUED | OUTPATIENT
Start: 2022-11-09 | End: 2022-11-11 | Stop reason: HOSPADM

## 2022-11-09 RX ORDER — SODIUM CHLORIDE, SODIUM LACTATE, POTASSIUM CHLORIDE, AND CALCIUM CHLORIDE .6; .31; .03; .02 G/100ML; G/100ML; G/100ML; G/100ML
500 INJECTION, SOLUTION INTRAVENOUS PRN
Status: DISCONTINUED | OUTPATIENT
Start: 2022-11-09 | End: 2022-11-09

## 2022-11-09 RX ORDER — SODIUM CHLORIDE 0.9 % (FLUSH) 0.9 %
5-40 SYRINGE (ML) INJECTION PRN
Status: DISCONTINUED | OUTPATIENT
Start: 2022-11-09 | End: 2022-11-11 | Stop reason: HOSPADM

## 2022-11-09 RX ORDER — MISOPROSTOL 200 UG/1
800 TABLET ORAL PRN
Status: DISCONTINUED | OUTPATIENT
Start: 2022-11-09 | End: 2022-11-09

## 2022-11-09 RX ORDER — SODIUM CHLORIDE, SODIUM LACTATE, POTASSIUM CHLORIDE, CALCIUM CHLORIDE 600; 310; 30; 20 MG/100ML; MG/100ML; MG/100ML; MG/100ML
INJECTION, SOLUTION INTRAVENOUS CONTINUOUS
Status: DISCONTINUED | OUTPATIENT
Start: 2022-11-10 | End: 2022-11-11 | Stop reason: HOSPADM

## 2022-11-09 RX ORDER — ACETAMINOPHEN 650 MG
TABLET, EXTENDED RELEASE ORAL
Status: DISCONTINUED
Start: 2022-11-09 | End: 2022-11-09

## 2022-11-09 RX ORDER — SODIUM CHLORIDE, SODIUM LACTATE, POTASSIUM CHLORIDE, CALCIUM CHLORIDE 600; 310; 30; 20 MG/100ML; MG/100ML; MG/100ML; MG/100ML
INJECTION, SOLUTION INTRAVENOUS CONTINUOUS
Status: DISCONTINUED | OUTPATIENT
Start: 2022-11-09 | End: 2022-11-09

## 2022-11-09 RX ORDER — METHYLERGONOVINE MALEATE 0.2 MG/ML
200 INJECTION INTRAVENOUS PRN
Status: DISCONTINUED | OUTPATIENT
Start: 2022-11-09 | End: 2022-11-09

## 2022-11-09 RX ORDER — SIMETHICONE 80 MG
80 TABLET,CHEWABLE ORAL EVERY 6 HOURS PRN
Status: DISCONTINUED | OUTPATIENT
Start: 2022-11-09 | End: 2022-11-11 | Stop reason: HOSPADM

## 2022-11-09 RX ADMIN — IBUPROFEN 800 MG: 800 TABLET, FILM COATED ORAL at 23:36

## 2022-11-09 RX ADMIN — Medication 15 ML/HR: at 21:00

## 2022-11-09 RX ADMIN — METHYLERGONOVINE MALEATE 200 MCG: 0.2 INJECTION, SOLUTION INTRAMUSCULAR; INTRAVENOUS at 23:23

## 2022-11-09 ASSESSMENT — PAIN SCALES - GENERAL: PAINLEVEL_OUTOF10: 4

## 2022-11-10 LAB
HCT VFR BLD CALC: 35.2 % (ref 34–48)
HEMOGLOBIN: 11.8 G/DL (ref 11.5–15.5)

## 2022-11-10 PROCEDURE — 85014 HEMATOCRIT: CPT

## 2022-11-10 PROCEDURE — 85018 HEMOGLOBIN: CPT

## 2022-11-10 PROCEDURE — 6370000000 HC RX 637 (ALT 250 FOR IP): Performed by: OBSTETRICS & GYNECOLOGY

## 2022-11-10 PROCEDURE — 1220000000 HC SEMI PRIVATE OB R&B

## 2022-11-10 PROCEDURE — 36415 COLL VENOUS BLD VENIPUNCTURE: CPT

## 2022-11-10 RX ORDER — IBUPROFEN 800 MG/1
800 TABLET ORAL EVERY 8 HOURS PRN
Qty: 30 TABLET | Refills: 0 | Status: SHIPPED | OUTPATIENT
Start: 2022-11-10

## 2022-11-10 RX ADMIN — IBUPROFEN 800 MG: 800 TABLET, FILM COATED ORAL at 17:10

## 2022-11-10 RX ADMIN — DOCUSATE SODIUM 100 MG: 100 CAPSULE, LIQUID FILLED ORAL at 20:25

## 2022-11-10 RX ADMIN — DOCUSATE SODIUM 100 MG: 100 CAPSULE, LIQUID FILLED ORAL at 09:00

## 2022-11-10 RX ADMIN — IBUPROFEN 800 MG: 800 TABLET, FILM COATED ORAL at 08:33

## 2022-11-10 ASSESSMENT — PAIN DESCRIPTION - DESCRIPTORS
DESCRIPTORS: CRAMPING
DESCRIPTORS: CRAMPING

## 2022-11-10 ASSESSMENT — PAIN SCALES - GENERAL
PAINLEVEL_OUTOF10: 3
PAINLEVEL_OUTOF10: 3

## 2022-11-10 ASSESSMENT — PAIN - FUNCTIONAL ASSESSMENT
PAIN_FUNCTIONAL_ASSESSMENT: ACTIVITIES ARE NOT PREVENTED
PAIN_FUNCTIONAL_ASSESSMENT: ACTIVITIES ARE NOT PREVENTED

## 2022-11-10 ASSESSMENT — PAIN DESCRIPTION - LOCATION
LOCATION: ABDOMEN
LOCATION: ABDOMEN

## 2022-11-10 NOTE — PROGRESS NOTES
Pt transferred to the bathroom with the steady. Pt unable to void. Pericare performed, gown changed, fresh pads and ice pack applied. Pt ambulated to wheelchair. Pt will transfer to mom/baby.

## 2022-11-10 NOTE — PROGRESS NOTES
Pt , 38 weeks, came in with c/o CTX that progressively got worse throughout the day. Pt states positive FM. Pt denies LOF and VB. VSS. Pt looks uncomfortable.

## 2022-11-10 NOTE — ANESTHESIA PRE PROCEDURE
Department of Anesthesiology  Preprocedure Note       Name:  Hemalatha Schultz   Age:  27 y.o.  :  1990                                          MRN:  [id number]         Date:  2022      Surgeon: * No surgeons listed *    Procedure: * No procedures listed *    Medications prior to admission:   Prior to Admission medications    Medication Sig Start Date End Date Taking? Authorizing Provider   Misc. Devices KIT Nature's Blend Prenatal Multivitamin with Minerals [facility] Baby Box)  [address]: [id number];   Take 1 softgel by mouth daily or as instructed by provider;  #qs for 6 months 10/12/22   Jairon Card MD   famotidine (PEPCID) 20 MG tablet Take 1 tablet by mouth 2 times daily  Patient not taking: Reported on 2022   Surya Fallon MD   Prenatal Vit-Fe Fumarate-FA (PRENATAL PO) Take by mouth    Historical Provider, MD       Current medications:    Current Facility-Administered Medications   Medication Dose Route Frequency Provider Last Rate Last Admin    povidone-iodine (BETADINE) 10 % external solution             lidocaine 1 % injection             lactated ringers infusion   IntraVENous Continuous Halima Womack MD        lactated ringers bolus  500 mL IntraVENous PRN Surya Fallon MD        Or    lactated ringers bolus  1,000 mL IntraVENous PRN Surya Fallon MD        methylergonovine (METHERGINE) injection 200 mcg  200 mcg IntraMUSCular PRN Surya Fallon MD        carboprost (HEMABATE) injection 250 mcg  250 mcg IntraMUSCular PRN Surya Fallon MD        miSOPROStol (CYTOTEC) tablet 800 mcg  800 mcg Rectal PRN Surya Fallon MD        tranexamic acid (CYKLOKAPRON) 1,000 mg in sodium chloride 0.9 % 100 mL IVPB  1,000 mg IntraVENous Once PRN Surya Fallon MD        oxytocin (PITOCIN) 15 units in 250 mL infusion  87.3 james-units/min IntraVENous Continuous PRN Surya Fallon MD        And    oxytocin (PITOCIN) 10 unit bolus from the bag  10 Units IntraVENous PRN Wiliam Garland MD        ondansetron [facility]) injection 4 mg  4 mg IntraVENous Q6H PRN Wiliam Garland MD        naloxone 0.4 mg in 10 mL sodium chloride syringe   IntraVENous PRN Tasia Mistry MD        ondansetron (ZOFRAN) injection 4 mg  4 mg IntraVENous Q6H PRN Tasia Mistry MD        fentaNYL 1.85mcg/ml and Bupivicaine 0.1% in 0.9% NS 135ml infusion (OB) epidural  15 mL/hr Epidural Continuous Tasia Mistry MD           Allergies:  No Known Allergies    Problem List:    Patient Active Problem List   Diagnosis Code    Maternal obesity, antepartum, second trimester O99.212    Cystic 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, antepartum O09.899, Z14.1    38 weeks gestation of pregnancy Z3A.38       Past Medical History:        Diagnosis Date    Anxiety        Past Surgical History:        Procedure Laterality Date    TONSILLECTOMY      WISDOM TOOTH EXTRACTION         Social History:    Social History     Tobacco Use    Smoking status: Never    Smokeless tobacco: Never   Substance Use Topics    Alcohol use: Not Currently     Comment: SOCIALLY                                Counseling given: Not Answered      Vital Signs (Current):   Vitals:    11/09/22 2002 11/09/22 2018   BP:  138/87   Pulse:  93   Resp:  16   Temp:  36.6 °C (97.8 °F)   TempSrc:  Oral   SpO2:  100%   Weight: 232 lb (105.2 kg)    Height: 5' 7\" (1.702 m)                                               BP Readings from Last 3 Encounters:   11/09/22 138/87   11/09/22 131/80   11/02/22 118/80       NPO Status:                                                                                 BMI:   Wt Readings from Last 3 Encounters:   11/09/22 232 lb (105.2 kg)   11/09/22 236 lb (107 kg)   11/02/22 234 lb (106.1 kg)     Body mass index is 36.34 kg/m².     CBC:   Lab Results   Component Value Date/Time    WBC 10.3 11/09/2022 08:10 PM    RBC 4.20 11/09/2022 08:10 PM    HGB 13.2 11/09/2022 08:10 PM    HCT 38.3 11/09/2022 08:10 PM    MCV 91.2 11/09/2022 08:10 PM RDW 14.5 11/09/2022 08:10 PM     11/09/2022 08:10 PM       CMP:   Lab Results   Component Value Date/Time     03/24/2022 03:23 PM    K 4.3 03/24/2022 03:23 PM     03/24/2022 03:23 PM    CO2 22 03/24/2022 03:23 PM    BUN 7 03/24/2022 03:23 PM    CREATININE 0.7 03/24/2022 03:23 PM    GFRAA >60 03/24/2022 03:23 PM    LABGLOM >60 03/24/2022 03:23 PM    GLUCOSE 132 08/25/2022 12:00 PM    GLUCOSE 89 03/24/2022 03:23 PM    PROT 7.2 03/24/2022 03:23 PM    CALCIUM 9.2 03/24/2022 03:23 PM    BILITOT 0.4 03/24/2022 03:23 PM    ALKPHOS 104 03/24/2022 03:23 PM    AST 21 03/24/2022 03:23 PM    ALT 25 03/24/2022 03:23 PM       POC Tests: No results for input(s): POCGLU, POCNA, POCK, POCCL, POCBUN, POCHEMO, POCHCT in the last 72 hours. Coags: No results found for: PROTIME, INR, APTT    HCG (If Applicable):   Lab Results   Component Value Date    PREGTESTUR pos 04/04/2022        ABGs: No results found for: PHART, PO2ART, OJX5MOH, JAX7SVG, BEART, K3UNPECH     Type & Screen (If Applicable):  No results found for: LABABO, LABRH    Drug/Infectious Status (If Applicable):  Lab Results   Component Value Date/Time    HEPCAB NON REACT 01/27/2014 01:15 PM       COVID-19 Screening (If Applicable):   Lab Results   Component Value Date/Time    COVID19 Not Detected 03/17/2021 12:31 PM           Anesthesia Evaluation   no history of anesthetic complications:   Airway: Mallampati: II  TM distance: >3 FB   Neck ROM: full  Mouth opening: > = 3 FB   Dental:          Pulmonary:Negative Pulmonary ROS and normal exam  breath sounds clear to auscultation                             Cardiovascular:Negative CV ROS            Rhythm: regular  Rate: normal                    Neuro/Psych:   (+) depression/anxiety             GI/Hepatic/Renal: Neg GI/Hepatic/Renal ROS            Endo/Other: Negative Endo/Other ROS                    Abdominal:             Vascular: negative vascular ROS.          Other Findings:           Anesthesia Plan      epidural     ASA 2             Anesthetic plan and risks discussed with patient.                         MAGALI Ryan - CRNA   11/9/2022

## 2022-11-10 NOTE — PROGRESS NOTES
Postpartum Day 1: Vaginal Delivery    The patient feels well. Pain is well controlled with current medications. Baby is feeding via breast.     Objective:        Vitals:    11/10/22 0441   BP: (!) 106/58   Pulse: 75   Resp: 18   Temp: 98.3 °F (36.8 °C)   SpO2: 97%         Lab Results   Component Value Date    WBC 10.3 11/09/2022    HGB 11.8 11/10/2022    HCT 35.2 11/10/2022    MCV 91.2 11/09/2022     11/09/2022       General:    alert, appears stated age, and cooperative   Lochia:  appropriate   Uterine    firm   DVT Evaluation:  No evidence of DVT seen on physical exam.     Assessment:     Status post Vaginal Delivery. good    Plan:     Continue current care.

## 2022-11-10 NOTE — PROCEDURES
DELIVERY NOTE    FEMALE infant   APGARS 8/9  Bulb suction on perineum  Cord clamped and cut after 30 second delay  Handed to waiting nursing staff  Placenta delivered without complication with three vessel cord intact  Cord gases obtained  No complications  EBL 191HJ  Condition stable  Sponge count correct  Mom and baby to post partum    Ghislaine Duncan MD

## 2022-11-10 NOTE — H&P
Department of Obstetrics and Gynecology  Nurse Practitioner Obstetrics History and Physical        CHIEF COMPLAINT:  Contractions    HISTORY OF PRESENT ILLNESS:    The patient is a 27 y.o. female , Patient's last menstrual period was 2022.,  at 38w4d. Pt presents to l&d with complaints of cramping. Pt denies lof, vb, or decreased fm. 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 She has never used smokeless tobacco.  ETOH:   reports that she does not currently use alcohol. DRUGS:   reports no history of drug use. Family History:       Problem Relation Age of Onset    Breast Cancer Maternal Grandmother     Lung Cancer Father     Colon Cancer Father     Diabetes Maternal Aunt     Diabetes Paternal Aunt     Depression Mother      Medications Prior to Admission:  Medications Prior to Admission: Misc. Devices KIT, Nature's Blend Prenatal Multivitamin with Minerals [facility] Baby Box)  Rosa [address] 46: [id number];   Take 1 softgel by mouth daily or as instructed by provider;  #qs for 6 months  famotidine (PEPCID) 20 MG tablet, Take 1 tablet by mouth 2 times daily (Patient not taking: Reported on 11/9/2022)  Prenatal Vit-Fe Fumarate-FA (PRENATAL PO), Take by mouth  Allergies:  Patient has no known allergies. Review of Systems:   Ears, nose, mouth, throat, and face: negative  Respiratory: negative  Cardiovascular: negative  Gastrointestinal: negative  Genitourinary:negative  Integument/breast: negative  Hematologic/lymphatic: negative  Musculoskeletal:negative  Neurological: negative  Behavioral/Psych: negative  Endocrine: negative  Allergic/Immunologic: negative  Psychosocial: negative    PHYSICAL EXAM:    General appearance:  awake, alert, cooperative, no apparent distress, and appears stated age  Neurologic:  Awake, alert, oriented to name, place and time. Cranial nerves II-XII are grossly intact. Lungs:  clear to auscultation bilaterally  Heart:  regular rate and rhythm  Abdomen:   soft, non-distended, non-tender, no masses palpated, gravid  Fetal heart rate:  Baseline Heart Rate 140, accelerations:  present, decels:none  Pelvis:  External Genitalia: no lesions  Cervix:  DILATION:  7 cm  EFFACEMENT:   90%  STATION:  -1  CONSISTENCY:  soft    Contraction frequency:  3-4 minutes  Membranes:  Intact    /79   Pulse 72   Temp 97.8 °F (36.6 °C) (Oral)   Resp 16   Ht 5' 7\" (1.702 m)   Wt 232 lb (105.2 kg)   LMP 02/12/2022   SpO2 100%   BMI 36.34 kg/m²                 Prenatal Labs  Blood Type/Rh: A pos  Antibody Screen: negative  Rubella: immune  T.  Pallidum, IgG: non-reactive   Hepatitis B Surface Antigen: non-reactive   HIV: non-reactive   Sickle Cell Screen: not available  Gonorrhea: negative  Chlamydia: negative  Group B Strep: negative        ASSESSMENT AND PLAN:  D/w Dr. Kristel Smith covering dr Emma Vazquez  Routine admission orders  Epidural          Electronically signed by MAGALI Beal - CNP on 11/9/2022 at 11:25 PM

## 2022-11-10 NOTE — FLOWSHEET NOTE
Patient admitted to room 319 with father of infant at bedside. Patient oriented to room and floor and instructed to call phone number provided or use call light PRN. Admission packet and infant safe sleep reviewed with verbalized understanding. Patient is now resting in bed and infant taken to nursery.

## 2022-11-10 NOTE — LACTATION NOTE
Second time mom breast fed her first baby for six months. Mom's goal for this baby is a least that long. Mom stated baby nursed well after birth but is throwing up and not interested in feeding right now. Mom has a breast pump for home use and requested a a hand held breast pump. Went over breastfeeding resources. Discussed frequency and duration of feedings and signs of adequate milk transfer. Encouraged mom to call us to observe a feeding.

## 2022-11-10 NOTE — ANESTHESIA PROCEDURE NOTES
Epidural Block    Patient location during procedure: OB  Reason for block: labor epidural  Staffing  Performed: resident/CRNA   Anesthesiologist: Jyoti Hyman MD  Resident/CRNA: MAGALI Bird - CRNA  Epidural  Patient position: sitting  Prep: ChloraPrep  Patient monitoring: cardiac monitor, continuous pulse ox and frequent blood pressure checks  Approach: midline  Location: L3-4  Injection technique: AIDA air  Provider prep: mask and sterile gloves  Needle  Needle type: Tuohy   Needle gauge: 18 G  Needle length: 3.5 in  Needle insertion depth: 7 cm  Catheter type: end hole  Catheter size: 20 G.   Catheter at skin depth: 14 cm  Test dose: negativeCatheter Secured: tegaderm and tape  Assessment  Hemodynamics: stable  Attempts: 1  Outcomes: uncomplicated and patient tolerated procedure well  Preanesthetic Checklist  Completed: patient identified, IV checked, site marked, risks and benefits discussed, surgical/procedural consents, equipment checked, pre-op evaluation, timeout performed, anesthesia consent given, oxygen available and monitors applied/VS acknowledged

## 2022-11-10 NOTE — PROGRESS NOTES
[employer] [place] screening results were discussed with parent. Questions answered. Brochure given to parent. Advised to monitor developmental milestones and contact physician for any concerns.    Andrés Oliveira

## 2022-11-10 NOTE — ANESTHESIA POSTPROCEDURE EVALUATION
Department of Anesthesiology  Postprocedure Note    Patient: Gus Lopez  MRN: [id number]  YOB: 1990  Date of evaluation: 11/10/2022      Procedure Summary     Date: 11/09/22 Room / Location:     Anesthesia Start: 2044 Anesthesia Stop: 2248    Procedure: Labor Analgesia Diagnosis:     Scheduled Providers:  Responsible Provider: Trista Worrell MD    Anesthesia Type: epidural ASA Status: 2          Anesthesia Type: No value filed.     Rahul Phase I:      Rahul Phase II:        Anesthesia Post Evaluation    Patient location during evaluation: bedside  Patient participation: complete - patient participated  Level of consciousness: awake and alert  Pain score: 0  Airway patency: patent  Nausea & Vomiting: no nausea and no vomiting  Complications: no  Cardiovascular status: hemodynamically stable  Respiratory status: room air  Hydration status: euvolemic

## 2022-11-11 VITALS
OXYGEN SATURATION: 100 % | SYSTOLIC BLOOD PRESSURE: 129 MMHG | RESPIRATION RATE: 18 BRPM | HEART RATE: 92 BPM | HEIGHT: 67 IN | DIASTOLIC BLOOD PRESSURE: 88 MMHG | BODY MASS INDEX: 36.41 KG/M2 | TEMPERATURE: 98.6 F | WEIGHT: 232 LBS

## 2022-11-11 PROCEDURE — 6370000000 HC RX 637 (ALT 250 FOR IP): Performed by: OBSTETRICS & GYNECOLOGY

## 2022-11-11 RX ADMIN — IBUPROFEN 800 MG: 800 TABLET, FILM COATED ORAL at 03:22

## 2022-11-11 RX ADMIN — DOCUSATE SODIUM 100 MG: 100 CAPSULE, LIQUID FILLED ORAL at 08:58

## 2022-11-11 ASSESSMENT — PAIN DESCRIPTION - ORIENTATION
ORIENTATION: LOWER
ORIENTATION: LOWER

## 2022-11-11 ASSESSMENT — PAIN DESCRIPTION - FREQUENCY
FREQUENCY: INTERMITTENT
FREQUENCY: INTERMITTENT

## 2022-11-11 ASSESSMENT — PAIN DESCRIPTION - PAIN TYPE
TYPE: ACUTE PAIN
TYPE: ACUTE PAIN

## 2022-11-11 ASSESSMENT — PAIN DESCRIPTION - DESCRIPTORS
DESCRIPTORS: DISCOMFORT;SORE
DESCRIPTORS: DISCOMFORT;SORE

## 2022-11-11 ASSESSMENT — PAIN DESCRIPTION - ONSET
ONSET: GRADUAL
ONSET: GRADUAL

## 2022-11-11 ASSESSMENT — PAIN SCALES - GENERAL
PAINLEVEL_OUTOF10: 3
PAINLEVEL_OUTOF10: 8

## 2022-11-11 ASSESSMENT — PAIN DESCRIPTION - LOCATION
LOCATION: PERINEUM
LOCATION: PERINEUM

## 2022-11-11 NOTE — DISCHARGE INSTRUCTIONS
Follow-up with your OB doctor in 1 week if  delivery or in  6 weeks for vaginal delivery unless otherwise instructed. Call office for an appointment. For breastfeeding support, you can contact our lactation specialists at [phone] or [phone]    DIET  Eat a well balanced diet focusing on foods high in fiber and protein  Drink plenty of fluids especially water. To avoid constipation you may take a mild stool softener as recommended by your doctor or midwife. ACTIVITY  Gradually increase your activity. Resume exercise regimen only after advised by your doctor or midwife. Avoid lifting anything heavier than your baby or a gallon of milk for SIX weeks. Avoid driving until your doctor or midwife has given their approval.  Joie Marlonradha slowly from a lying to sitting and then a standing position. Climb stairs one at a time. Use caution when carrying your baby up and down the stairs. No sexual activity for 6 weeks or until advised by your doctor - Nothing in vagina: intercourse, tampons, or douching. Be prepared to discuss family planning at your follow-up OB visit. You may feel tired or have a lack of energy. You may continue your prenatal vitamin to replenish nutrients post delivery. Nap when baby naps to catch up on sleep. May return to work or school in 6 weeks or as directed by OB. EMOTIONS  You may feed lugo, sad, teary, & overwhelmed. Contact your OB provider if you feel you may be showing signs of postpartum depression, or have thoughts of harming yourself or your infant. If infant will not stop crying, contact another adult for help or place infant in their crib on their back and take a break. NEVER shake your infant. BLEEDING  Vaginal bleeding will decrease in amount over the next few weeks. You will notice that as your activity increases, your flow may increase. This is your body's way of telling you, you need to take things easier and rest more often.   Call your OB/ER if you are saturating more than one maxi pad in an hour. BREAST CARE  Take medications as recommended by your doctor or midwife for pain  If you develop a warm, red, tender area on your breast or develop a fever contact your OB provider. For breastfeeding moms:  If you become engorged, feeding may be more difficult or painful for 1-2 days. You may find it helpful to hand express some milk so that the infant can latch on more easily. While breastfeeding, continue to take your prenatal vitamins as directed by your doctor or midwife. Only take medications verified as safe for breastfeeding. For non-breastfeeding moms:  You may apply ice packs to your breasts over your bra for twenty minutes at a time for comfort. Avoid stimulation to your breasts, when showering allow the water to strike your back not your breasts. Wear a good fitting bra until your milk dries, such as a sports bra. INCISIONAL CARE / TIFFANY CARE  Clean your incision in the shower with mild soap. After shower pat the incision area dry and leave open to air. If used, Steri-stipes should be removed by 2 weeks. If used, Tona Slim should be removed by the OB in office by 1 week. If used/ordered, an abdominal binder may provide support for your incision. Use the tiffany-bottle after toileting until bleeding stops. Cleanse your perineum from front to back  If used, stitches or internal clips will dissolve in 4-6 weeks. You may use a sitz bath or soak in a clean tub as needed for comfort. Kegel exercises will help restore bladder control. SWELLING  Keep your legs elevated when sitting or lying. When wearing stocking or socks, make sure they are not too tight. WHEN TO CALL THE DOCTOR  If you have a temp of 100.4 or more. If your bleeding has increased and you are saturating a pad in an hour. Your abdomen is tender to touch. You are passing blood clots bigger than the size of a lemon.   If you are experiencing extreme weakness or dizziness. If you are having flu-like symptoms such as achy muscles or joints. There is a foul smell or a green color to your vaginal bleeding. If you have pain that cannot be relieved. You have persistent burning with urination or frequency. Call if you have concerns about your well-being. You are unable to sleep, eat, or are having thoughts of harming yourself or your baby. You have swelling, bleeding, drainage, foul odor, redness, or warmth in/around your incision or stitches. You have a red, warm, tender area in you calf. Never Shake a Baby Promise    Shaking can kill a baby. It can also cause seizures, brain damage, learning problems, cerebral palsy, blindness and other serious health and developmental problems. I have seen the video about shaking a baby and understand that shaking a baby is a serious form of child abuse. I Promise Never To Shake My Baby    I understand that caregivers other then the mother often shake babies. I also promise to discuss the dangers of shaking a baby with everyone who takes care of my baby. I promise to tell anyone who cares for my baby to never, never shake my baby. I have received the [address] [place] [address] Baby Syndrome Teaching Tool and Certificate.

## 2022-11-11 NOTE — PLAN OF CARE
Problem: Vaginal Birth or  Section  Goal: Fetal and maternal status remain reassuring during the birth process  Description:  Birth OB-Pregnancy care plan goal which identifies if the fetal and maternal status remain reassuring during the birth process  Outcome: Progressing     Problem: Postpartum  Goal: Experiences normal postpartum course  Description:  Postpartum OB-Pregnancy care plan goal which identifies if the mother is experiencing a normal postpartum course  Outcome: Progressing     Problem: Postpartum  Goal: Appropriate maternal -  bonding  Description:  Postpartum OB-Pregnancy care plan goal which identifies if the mother and  are bonding appropriately  Outcome: Progressing     Problem: Postpartum  Goal: Establishment of infant feeding pattern  Description:  Postpartum OB-Pregnancy care plan goal which identifies if the mother is establishing a feeding pattern with their   Outcome: Progressing     Problem: Postpartum  Goal: Incisions, wounds, or drain sites healing without S/S of infection  Outcome: Progressing     Problem: Pain  Goal: Verbalizes/displays adequate comfort level or baseline comfort level  Outcome: Progressing     Problem: Safety - Adult  Goal: Free from fall injury  Outcome: Progressing     Problem: Discharge Planning  Goal: Discharge to home or other facility with appropriate resources  Outcome: Progressing

## 2023-08-04 PROBLEM — O99.212 MATERNAL OBESITY, ANTEPARTUM, SECOND TRIMESTER: Status: RESOLVED | Noted: 2022-06-01 | Resolved: 2023-08-04

## 2023-08-04 PROBLEM — Z3A.38 38 WEEKS GESTATION OF PREGNANCY: Status: RESOLVED | Noted: 2022-11-09 | Resolved: 2023-08-04

## 2023-08-04 PROBLEM — Z37.9 NORMAL LABOR: Status: RESOLVED | Noted: 2022-11-09 | Resolved: 2023-08-04

## 2023-11-07 ENCOUNTER — HOSPITAL ENCOUNTER (OUTPATIENT)
Dept: GENERAL RADIOLOGY | Age: 33
Discharge: HOME | End: 2023-11-09
Payer: COMMERCIAL

## 2023-11-07 VITALS — WEIGHT: 200 LBS | BODY MASS INDEX: 31.39 KG/M2 | HEIGHT: 67 IN

## 2023-11-07 DIAGNOSIS — Z80.3 FAMILY HISTORY OF BREAST CANCER: ICD-10-CM

## 2023-11-07 PROCEDURE — 77063 BREAST TOMOSYNTHESIS BI: CPT

## 2023-12-08 ENCOUNTER — CLINICAL DOCUMENTATION (OUTPATIENT)
Dept: GENERAL RADIOLOGY | Age: 33
End: 2023-12-08

## 2023-12-28 ENCOUNTER — HOSPITAL ENCOUNTER (OUTPATIENT)
Dept: MRI IMAGING | Age: 33
Discharge: HOME | End: 2023-12-30
Payer: COMMERCIAL

## 2023-12-28 DIAGNOSIS — Z91.89 AT HIGH RISK FOR BREAST CANCER: ICD-10-CM

## 2023-12-28 DIAGNOSIS — R92.333 HETEROGENEOUSLY DENSE TISSUE OF BOTH BREASTS ON MAMMOGRAPHY: ICD-10-CM

## 2023-12-28 DIAGNOSIS — Z80.3 FAMILY HISTORY OF BREAST CANCER: ICD-10-CM

## 2023-12-28 PROCEDURE — 6360000004 HC RX CONTRAST MEDICATION: Performed by: RADIOLOGY

## 2023-12-28 PROCEDURE — A9585 GADOBUTROL INJECTION: HCPCS | Performed by: RADIOLOGY

## 2023-12-28 PROCEDURE — C8908 MRI W/O FOL W/CONT, BREAST,: HCPCS

## 2023-12-28 RX ORDER — GADOBUTROL 604.72 MG/ML
9 INJECTION INTRAVENOUS
Status: COMPLETED | OUTPATIENT
Start: 2023-12-28 | End: 2023-12-28

## 2023-12-28 RX ADMIN — GADOBUTROL 9 ML: 604.72 INJECTION INTRAVENOUS at 10:11

## 2024-12-09 ENCOUNTER — TELEPHONE (OUTPATIENT)
Dept: PRIMARY CARE CLINIC | Age: 34
End: 2024-12-09

## 2024-12-09 NOTE — TELEPHONE ENCOUNTER
----- Message from Claribel MOREIRA sent at 12/9/2024  1:50 PM EST -----  Regarding: [facility] Appointment Request  [facility] Appointment Request    Patient needs appointment for [facility] Appointment Type: New Patient.    Patient Requested Dates(s): Tuesday, Wednesday. It defends on the day  Patient Requested Time: Flexible   Provider Name: Will Hudson DO    Reason for Appointment Request: New Patient - No appointments available during search.  New Patient and she possible hyperthyroid.   --------------------------------------------------------------------------------------------------------------------------    Relationship to Patient: Self     Call Back Information: OK to leave message on voicemail  Preferred Call Back Number: Phone [phone]

## 2025-01-04 SDOH — HEALTH STABILITY: PHYSICAL HEALTH: ON AVERAGE, HOW MANY DAYS PER WEEK DO YOU ENGAGE IN MODERATE TO STRENUOUS EXERCISE (LIKE A BRISK WALK)?: 7 DAYS

## 2025-01-04 SDOH — HEALTH STABILITY: PHYSICAL HEALTH: ON AVERAGE, HOW MANY MINUTES DO YOU ENGAGE IN EXERCISE AT THIS LEVEL?: 60 MIN

## 2025-01-07 ENCOUNTER — OFFICE VISIT (OUTPATIENT)
Dept: PRIMARY CARE CLINIC | Age: 35
End: 2025-01-07
Payer: COMMERCIAL

## 2025-01-07 VITALS
WEIGHT: 159 LBS | HEART RATE: 71 BPM | OXYGEN SATURATION: 97 % | BODY MASS INDEX: 24.96 KG/M2 | DIASTOLIC BLOOD PRESSURE: 74 MMHG | HEIGHT: 67 IN | SYSTOLIC BLOOD PRESSURE: 118 MMHG

## 2025-01-07 DIAGNOSIS — R00.2 PALPITATIONS: ICD-10-CM

## 2025-01-07 DIAGNOSIS — R00.2 PALPITATIONS: Primary | ICD-10-CM

## 2025-01-07 DIAGNOSIS — E03.9 HYPOTHYROIDISM, UNSPECIFIED TYPE: ICD-10-CM

## 2025-01-07 DIAGNOSIS — R63.4 WEIGHT LOSS: ICD-10-CM

## 2025-01-07 DIAGNOSIS — E78.00 HYPERCHOLESTEROLEMIA: ICD-10-CM

## 2025-01-07 LAB
BASOPHILS ABSOLUTE: 0.04 K/UL (ref 0–0.2)
BASOPHILS RELATIVE PERCENT: 1 % (ref 0–2)
BILIRUBIN, URINE: NEGATIVE
COLOR, UA: YELLOW
EOSINOPHILS ABSOLUTE: 0.07 K/UL (ref 0.05–0.5)
EOSINOPHILS RELATIVE PERCENT: 1 % (ref 0–6)
GLUCOSE URINE: NEGATIVE MG/DL
HCT VFR BLD CALC: 43.7 % (ref 34–48)
HEMOGLOBIN: 14.1 G/DL (ref 11.5–15.5)
IMMATURE GRANULOCYTES %: 0 % (ref 0–5)
IMMATURE GRANULOCYTES ABSOLUTE: <0.03 K/UL (ref 0–0.58)
KETONES, URINE: NEGATIVE MG/DL
LEUKOCYTE ESTERASE, URINE: NEGATIVE
LYMPHOCYTES ABSOLUTE: 2.16 K/UL (ref 1.5–4)
LYMPHOCYTES RELATIVE PERCENT: 28 % (ref 20–42)
MCH RBC QN AUTO: 31.2 PG (ref 26–35)
MCHC RBC AUTO-ENTMCNC: 32.3 G/DL (ref 32–34.5)
MCV RBC AUTO: 96.7 FL (ref 80–99.9)
MONOCYTES ABSOLUTE: 0.58 K/UL (ref 0.1–0.95)
MONOCYTES RELATIVE PERCENT: 7 % (ref 2–12)
NEUTROPHILS ABSOLUTE: 4.95 K/UL (ref 1.8–7.3)
NEUTROPHILS RELATIVE PERCENT: 63 % (ref 43–80)
NITRITE, URINE: NEGATIVE
PDW BLD-RTO: 13 % (ref 11.5–15)
PH, URINE: 7 (ref 5–9)
PLATELET # BLD: 263 K/UL (ref 130–450)
PMV BLD AUTO: 11.3 FL (ref 7–12)
PROTEIN UA: NEGATIVE MG/DL
RBC # BLD: 4.52 M/UL (ref 3.5–5.5)
RBC UA: NORMAL /HPF
SPECIFIC GRAVITY UA: 1.02 (ref 1–1.03)
TURBIDITY: CLEAR
URINE HGB: NEGATIVE
UROBILINOGEN, URINE: 0.2 EU/DL (ref 0–1)
WBC # BLD: 7.8 K/UL (ref 4.5–11.5)
WBC UA: NORMAL /HPF

## 2025-01-07 PROCEDURE — 99204 OFFICE O/P NEW MOD 45 MIN: CPT | Performed by: INTERNAL MEDICINE

## 2025-01-07 PROCEDURE — 93000 ELECTROCARDIOGRAM COMPLETE: CPT | Performed by: INTERNAL MEDICINE

## 2025-01-07 SDOH — ECONOMIC STABILITY: FOOD INSECURITY: WITHIN THE PAST 12 MONTHS, THE FOOD YOU BOUGHT JUST DIDN'T LAST AND YOU DIDN'T HAVE MONEY TO GET MORE.: NEVER TRUE

## 2025-01-07 SDOH — ECONOMIC STABILITY: INCOME INSECURITY: HOW HARD IS IT FOR YOU TO PAY FOR THE VERY BASICS LIKE FOOD, HOUSING, MEDICAL CARE, AND HEATING?: NOT HARD AT ALL

## 2025-01-07 SDOH — ECONOMIC STABILITY: FOOD INSECURITY: WITHIN THE PAST 12 MONTHS, YOU WORRIED THAT YOUR FOOD WOULD RUN OUT BEFORE YOU GOT MONEY TO BUY MORE.: NEVER TRUE

## 2025-01-07 ASSESSMENT — PATIENT HEALTH QUESTIONNAIRE - PHQ9
8. MOVING OR SPEAKING SO SLOWLY THAT OTHER PEOPLE COULD HAVE NOTICED. OR THE OPPOSITE, BEING SO FIGETY OR RESTLESS THAT YOU HAVE BEEN MOVING AROUND A LOT MORE THAN USUAL: NOT AT ALL
4. FEELING TIRED OR HAVING LITTLE ENERGY: SEVERAL DAYS
9. THOUGHTS THAT YOU WOULD BE BETTER OFF DEAD, OR OF HURTING YOURSELF: NOT AT ALL
6. FEELING BAD ABOUT YOURSELF - OR THAT YOU ARE A FAILURE OR HAVE LET YOURSELF OR YOUR FAMILY DOWN: NOT AT ALL
5. POOR APPETITE OR OVEREATING: SEVERAL DAYS
3. TROUBLE FALLING OR STAYING ASLEEP: SEVERAL DAYS
1. LITTLE INTEREST OR PLEASURE IN DOING THINGS: NOT AT ALL
2. FEELING DOWN, DEPRESSED OR HOPELESS: NOT AT ALL
SUM OF ALL RESPONSES TO PHQ QUESTIONS 1-9: 4
SUM OF ALL RESPONSES TO PHQ QUESTIONS 1-9: 4
10. IF YOU CHECKED OFF ANY PROBLEMS, HOW DIFFICULT HAVE THESE PROBLEMS MADE IT FOR YOU TO DO YOUR WORK, TAKE CARE OF THINGS AT HOME, OR GET ALONG WITH OTHER PEOPLE: NOT DIFFICULT AT ALL
SUM OF ALL RESPONSES TO PHQ QUESTIONS 1-9: 4
7. TROUBLE CONCENTRATING ON THINGS, SUCH AS READING THE NEWSPAPER OR WATCHING TELEVISION: SEVERAL DAYS
SUM OF ALL RESPONSES TO PHQ QUESTIONS 1-9: 4
SUM OF ALL RESPONSES TO PHQ9 QUESTIONS 1 & 2: 0

## 2025-01-07 NOTE — PROGRESS NOTES
palpitations  I will obtain blood work to rule out any metabolic or hematologic causes of her  symptoms  Obtain urinalysis due to unexplained weight loss  Recent mammogram unremarkable, follows with gynecology for paps  Screening colonoscopy not indicated until 40 due to family history  Discussed earlier colonoscopy for bowel changes - she deferred today  Other diagnostic options include autoimmune testing and CTs of the chest and abdomen   Consider trial of beta blocker for anxiety, tremors and palpitations        Electronically signed by Will Hudson DO on 1/7/2025 at 2:13 PM                          /74   Pulse 71   Ht 1.702 m (5' 7\")   Wt 72.1 kg (159 lb)   LMP 12/22/2024 Comment: Sometimes last 7-14 days  SpO2 97%   BMI 24.90 kg/m²     Review of Systems  Constitutional:Negative for activity change, appetite change, chills, fatigue and fever.   Respiratory: Negative for choking, chest tightness, shortness of breath and wheezing.  Cardiovascular: Negative for chest pain, palpitations and leg swelling.   Gastrointestinal: Negative for abdominal distention, constipation, diarrhea, nausea and vomiting.   Musculoskeletal: Negative for arthralgias, back pain, gait problem and joint swelling.   Neurological: Negative for dizziness, weakness,numbness and headaches.     Patient Active Problem List    Diagnosis Date Noted    Cystic 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, antepartum 06/01/2022   No Known Allergies  Current Outpatient Medications on File Prior to Visit   Medication Sig Dispense Refill    Norgestim-Eth Estrad Triphasic (TRI-SPRINTEC) 0.18/0.215/0.25 MG-35 MCG TABS Take 1 tablet by mouth daily 3 packet 2    sertraline (ZOLOFT) 100 MG tablet Take 1 tablet by mouth daily 90 tablet 3     No current facility-administered medications on file prior to visit.       Physical Exam   Constitutional:  Oriented to person, place, and time. Appears well-developed and well-nourished. No acute distress.   HENT: No sinus

## 2025-01-08 LAB
ALBUMIN: 4.5 G/DL (ref 3.5–5.2)
ALP BLD-CCNC: 60 U/L (ref 35–104)
ALT SERPL-CCNC: 16 U/L (ref 0–32)
ANION GAP SERPL CALCULATED.3IONS-SCNC: 21 MMOL/L (ref 7–16)
AST SERPL-CCNC: 21 U/L (ref 0–31)
BILIRUB SERPL-MCNC: 0.5 MG/DL (ref 0–1.2)
BUN BLDV-MCNC: 12 MG/DL (ref 6–20)
CALCIUM SERPL-MCNC: 9.5 MG/DL (ref 8.6–10.2)
CHLORIDE BLD-SCNC: 107 MMOL/L (ref 98–107)
CHOLESTEROL, TOTAL: 224 MG/DL
CO2: 19 MMOL/L (ref 22–29)
CREAT SERPL-MCNC: 0.9 MG/DL (ref 0.5–1)
GFR, ESTIMATED: 88 ML/MIN/1.73M2
GLUCOSE BLD-MCNC: 79 MG/DL (ref 74–99)
HDLC SERPL-MCNC: 73 MG/DL
LDL CHOLESTEROL: 123 MG/DL
POTASSIUM SERPL-SCNC: 4 MMOL/L (ref 3.5–5)
SODIUM BLD-SCNC: 147 MMOL/L (ref 132–146)
T3 FREE: 2.88 PG/ML (ref 2–4.4)
THYROXINE (T4): 8.3 UG/DL (ref 4.5–11.7)
TOTAL PROTEIN: 7.6 G/DL (ref 6.4–8.3)
TRIGL SERPL-MCNC: 138 MG/DL
VLDLC SERPL CALC-MCNC: 28 MG/DL

## 2025-01-08 NOTE — RESULT ENCOUNTER NOTE
Noted elevated cholesterol. Since she is younger than 40, I would recommend diet and exercise.    Noted elevated sodium, I suspect lab error and would like to repeat this lab in 2-4 weeks.    Unremarkable t3 and t4 ---- noted pending TSH

## 2025-01-09 LAB — TSH SERPL DL<=0.05 MIU/L-ACNC: 2.17 UIU/ML (ref 0.27–4.2)

## 2025-02-19 ENCOUNTER — HOSPITAL ENCOUNTER (OUTPATIENT)
Age: 35
Discharge: HOME | End: 2025-02-19
Payer: COMMERCIAL

## 2025-02-19 LAB
CRP SERPL HS-MCNC: <3 MG/L (ref 0–5)
ERYTHROCYTE [SEDIMENTATION RATE] IN BLOOD BY WESTERGREN METHOD: 2 MM/HR (ref 0–20)
RHEUMATOID FACT SER NEPH-ACNC: 15 IU/ML (ref 0–13)
TSH SERPL DL<=0.05 MIU/L-ACNC: 1.95 UIU/ML (ref 0.27–4.2)
URATE SERPL-MCNC: 3.3 MG/DL (ref 2.4–5.7)

## 2025-02-19 PROCEDURE — 86431 RHEUMATOID FACTOR QUANT: CPT

## 2025-02-19 PROCEDURE — 85652 RBC SED RATE AUTOMATED: CPT

## 2025-02-19 PROCEDURE — 86140 C-REACTIVE PROTEIN: CPT

## 2025-02-19 PROCEDURE — 86038 ANTINUCLEAR ANTIBODIES: CPT

## 2025-02-19 PROCEDURE — 84550 ASSAY OF BLOOD/URIC ACID: CPT

## 2025-02-19 PROCEDURE — 86225 DNA ANTIBODY NATIVE: CPT

## 2025-02-19 PROCEDURE — 86039 ANTINUCLEAR ANTIBODIES (ANA): CPT

## 2025-02-19 PROCEDURE — 84443 ASSAY THYROID STIM HORMONE: CPT

## 2025-02-19 PROCEDURE — 36415 COLL VENOUS BLD VENIPUNCTURE: CPT

## 2025-02-19 PROCEDURE — 86200 CCP ANTIBODY: CPT

## 2025-02-20 DIAGNOSIS — M05.80 POLYARTHRITIS WITH POSITIVE RHEUMATOID FACTOR (HCC): Primary | ICD-10-CM

## 2025-02-20 LAB
ANA SER QL IA: NEGATIVE
ANTI DNA DOUBLE STRANDED: NEGATIVE
CCP AB SER IA-ACNC: 0.9 U/ML (ref 0–7)

## 2025-04-02 NOTE — PROGRESS NOTES
[facility] RHEUMATOLOGY  [address] [place] [id number]  Dept: [phone]  Dept Fax: [phone]    Rebecca Padilla 1990 is a 34 y.o. female, here for evaluation of the following chief complaint(s): New Patient (Elevated Rheumatoid Factor)      Subjective   SUBJECTIVE/OBJECTIVE:    HPI: Rebecca Padilla is a 34 y.o. female with a history of anxiety referred by PCP for +RF. Appears labs were obtained for unintentional weight loss, palpitations, tremulousness, anxiety and notable for negative CHIKIS & CCP, RF 15, normal inflammatory markers, normal CBC, CMP essentially normal.     Patient states pain in the L shoulder and R wrist, shoulder pain started suddenly but resolved after ~ 4 mos, now only pain in the R wrist which started relatively suddenly, constant, no obvious triggers. Has had intermittent rashes, previously saw dermatology, prescribed topical CS w/relief. Feels cervical LAD. Endorses hair loss after having her two children. Endorses intermittent spontaneous palpitations and notes lightheadedness upon standing. Frequent abdominal pain and varying diarrhea/constipation. 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     FMH: No known FMH of autoimmunity    Social: Works as an RN in [place], denies tobacco use, drank wine regularly in the past but none currently    Review of Systems  10 point ROS negative except as noted in HPI       Objective   Vitals:    04/03/25 1343   BP: 106/76   Pulse: 83   SpO2: 98%        Physical Exam  General appearance: Pleasant, cooperative, in no acute distress  Eyes: Conjunctiva clear, PERRL  HENT: External ears and nose normal  Respiratory: Normal effort and rate; CTAB; no wheezes, rales, or rhonchi  Cardiovascular: RRR; no murmurs, rubs, or gallops; no LE edema  Skin: No rash, thickening, nodules, ulceration, or calcifications  Neurologic: No focal deficit  Musculoskeletal:  Upper Extremities  Shoulders: No appreciable deformity, swelling, or effusion; normal

## 2025-04-03 ENCOUNTER — OFFICE VISIT (OUTPATIENT)
Dept: RHEUMATOLOGY | Age: 35
End: 2025-04-03
Payer: COMMERCIAL

## 2025-04-03 VITALS
SYSTOLIC BLOOD PRESSURE: 106 MMHG | BODY MASS INDEX: 24.33 KG/M2 | DIASTOLIC BLOOD PRESSURE: 76 MMHG | HEART RATE: 83 BPM | OXYGEN SATURATION: 98 % | HEIGHT: 67 IN | WEIGHT: 155 LBS

## 2025-04-03 DIAGNOSIS — M75.42 IMPINGEMENT SYNDROME OF LEFT SHOULDER: ICD-10-CM

## 2025-04-03 DIAGNOSIS — R63.4 WEIGHT LOSS, UNINTENTIONAL: ICD-10-CM

## 2025-04-03 DIAGNOSIS — D80.9 IMMUNOGLOBULIN DEFICIENCY: ICD-10-CM

## 2025-04-03 DIAGNOSIS — R89.4 SEROLOGIC ABNORMALITY: Primary | ICD-10-CM

## 2025-04-03 PROCEDURE — 99204 OFFICE O/P NEW MOD 45 MIN: CPT | Performed by: STUDENT IN AN ORGANIZED HEALTH CARE EDUCATION/TRAINING PROGRAM

## 2025-04-03 ASSESSMENT — RHEUMATOLOGY NEW PATIENT QUESTIONNAIRE
JOINT SWELLING: N
RASH: Y
EXCESSIVE HAIR LOSS (MORE THAN YOUR NORM): Y
UNEXPLAINED WEIGHT CHANGE: Y
JAUNDICE: N
UNUSUAL FATIGUE: Y
HOARSE VOICE: N
EASY BRUISING: N
VAGINAL DRYNESS: N
DEPRESSION: N
RASH OR ULCERS: N
EASILY LOSING TEMPER: Y
PAIN OR BURNING ON URINATION: N
UNEXPLAINED HEARING LOSS: N
NAUSEA: Y
LOSS OF VISION: N
EYE REDNESS: N
DRYNESS OF MOUTH: N
SKIN TIGHTNESS: N
STOMACH PAIN: N
MORNING STIFFNESS: N
CHEST PAIN: N
UNUSUALLY RAPID OR SLOWED HEART RATE: Y
HEARTBURN OR REFLUX: N
ABNORMAL URINE: N
MEMORY LOSS: N
SEIZURES: N
NIGHT SWEATS: Y
UNUSUAL BLEEDING: N
INCREASED SUSCEPTIBILITY TO INFECTION: N
DIFFICULTY BREATHING LYING DOWN: N
LOSS OF CONSCIOUSNESS: N
DIFFICULTY FALLING ASLEEP: N
DOUBLE OR BLURRED VISION: N
DIFFICULTY SWALLOWING: N
BLACK STOOLS: N
COUGH: N
BEHAVIORAL CHANGES: N
ANXIETY: Y
SKIN REDNESS: N
NODULES/BUMPS: N
BLOOD IN STOOLS: N
HEADACHES: N
EYE PAIN: Y
AGITATION: N
SWOLLEN LEGS OR FEET: N
DIFFICULTY STAYING ASLEEP: N
MUSCLE WEAKNESS: Y
SWOLLEN OR TENDER GLANDS: Y
COLOR CHANGES OF HANDS OR FEET IN THE COLD: N
FEVER: N
PERSISTENT DIARRHEA: N
SHORTNESS OF BREATH: N
JOINT PAIN: Y
NUMBNESS OR TINGLING IN HANDS OR FEET: N
ANEMIA: N
SORES IN MOUTH OR NOSE: N
SUN SENSITIVE (SUN ALLERGY): N
FAINTING: N
EYE DRYNESS: Y

## 2025-04-03 NOTE — PROGRESS NOTES
Rebecca is here today as a new patient with reports losing a significant amount of weight in a short amount of time.  She reports pain to her shoulders and wrists for the past year.  She has also had a lot of skin issues in the past.  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  She was on Amoxicillin for 3 years starting at age 11.  She has  a lot of fatigue.

## 2025-04-07 ENCOUNTER — HOSPITAL ENCOUNTER (OUTPATIENT)
Age: 35
Discharge: HOME | End: 2025-04-07
Payer: COMMERCIAL

## 2025-04-07 DIAGNOSIS — D80.9 IMMUNOGLOBULIN DEFICIENCY: ICD-10-CM

## 2025-04-07 PROCEDURE — 82784 ASSAY IGA/IGD/IGG/IGM EACH: CPT

## 2025-04-07 PROCEDURE — 86334 IMMUNOFIX E-PHORESIS SERUM: CPT

## 2025-04-07 PROCEDURE — 84165 PROTEIN E-PHORESIS SERUM: CPT

## 2025-04-07 PROCEDURE — 84155 ASSAY OF PROTEIN SERUM: CPT

## 2025-04-07 PROCEDURE — 36415 COLL VENOUS BLD VENIPUNCTURE: CPT

## 2025-04-08 DIAGNOSIS — N63.25 BREAST LUMP ON LEFT SIDE AT 6 O'CLOCK POSITION: Primary | ICD-10-CM

## 2025-04-08 LAB
IGA SERPL-MCNC: 80 MG/DL (ref 70–400)
IGG SERPL-MCNC: 988 MG/DL (ref 700–1600)
IGM SERPL-MCNC: 133 MG/DL (ref 40–230)

## 2025-04-09 LAB
ALBUMIN SERPL-MCNC: 3.8 G/DL (ref 3.5–4.7)
ALPHA1 GLOB SERPL ELPH-MCNC: 0.3 G/DL (ref 0.2–0.4)
ALPHA2 GLOB SERPL ELPH-MCNC: 0.7 G/DL (ref 0.5–1)
B-GLOBULIN SERPL ELPH-MCNC: 1 G/DL (ref 0.8–1.3)
GAMMA GLOB SERPL ELPH-MCNC: 1.2 G/DL (ref 0.7–1.6)
PATHOLOGIST: NORMAL
PROT PATTERN SERPL ELPH-IMP: NORMAL
PROT SERPL-MCNC: 6.9 G/DL (ref 6.4–8.3)

## 2025-04-10 ENCOUNTER — RESULTS FOLLOW-UP (OUTPATIENT)
Dept: RHEUMATOLOGY | Age: 35
End: 2025-04-10

## 2025-04-12 LAB
INTERPRETATION SERPL IFE-IMP: NORMAL
PATHOLOGY STUDY: NORMAL

## 2025-05-06 ENCOUNTER — HOSPITAL ENCOUNTER (OUTPATIENT)
Dept: GENERAL RADIOLOGY | Age: 35
Discharge: HOME | End: 2025-05-08
Payer: COMMERCIAL

## 2025-05-06 VITALS — WEIGHT: 155 LBS | BODY MASS INDEX: 24.28 KG/M2

## 2025-05-06 DIAGNOSIS — N63.25 BREAST LUMP ON LEFT SIDE AT 6 O'CLOCK POSITION: ICD-10-CM

## 2025-05-06 PROCEDURE — 76642 ULTRASOUND BREAST LIMITED: CPT

## 2025-05-06 PROCEDURE — G0279 TOMOSYNTHESIS, MAMMO: HCPCS

## 2025-09-05 ENCOUNTER — OFFICE VISIT (OUTPATIENT)
Age: 35
End: 2025-09-05

## 2025-09-05 VITALS
WEIGHT: 144 LBS | HEART RATE: 89 BPM | TEMPERATURE: 98 F | OXYGEN SATURATION: 98 % | RESPIRATION RATE: 19 BRPM | SYSTOLIC BLOOD PRESSURE: 116 MMHG | DIASTOLIC BLOOD PRESSURE: 70 MMHG | BODY MASS INDEX: 22.55 KG/M2

## 2025-09-05 DIAGNOSIS — R61 NIGHT SWEATS: Primary | ICD-10-CM

## 2025-09-05 DIAGNOSIS — R63.4 UNEXPLAINED WEIGHT LOSS: ICD-10-CM

## 2025-09-05 DIAGNOSIS — R59.1 LYMPHADENOPATHY: ICD-10-CM

## 2025-09-05 ASSESSMENT — ENCOUNTER SYMPTOMS
NAUSEA: 0
ABDOMINAL DISTENTION: 0
EYE REDNESS: 0
EYE PAIN: 0
SINUS PRESSURE: 0
COUGH: 0
VOMITING: 0
BACK PAIN: 0
SORE THROAT: 0
WHEEZING: 0
DIARRHEA: 0
SHORTNESS OF BREATH: 0
EYE DISCHARGE: 0